# Patient Record
Sex: MALE | Race: WHITE | Employment: OTHER
[De-identification: names, ages, dates, MRNs, and addresses within clinical notes are randomized per-mention and may not be internally consistent; named-entity substitution may affect disease eponyms.]

---

## 2017-02-28 RX ORDER — CLONIDINE HYDROCHLORIDE 0.3 MG/1
0.3 TABLET ORAL 2 TIMES DAILY
Qty: 60 TABLET | Refills: 3 | Status: SHIPPED | OUTPATIENT
Start: 2017-02-28 | End: 2017-03-03 | Stop reason: SDUPTHER

## 2017-03-03 ENCOUNTER — OFFICE VISIT (OUTPATIENT)
Dept: FAMILY MEDICINE CLINIC | Facility: CLINIC | Age: 77
End: 2017-03-03

## 2017-03-03 ENCOUNTER — HOSPITAL ENCOUNTER (OUTPATIENT)
Age: 77
Setting detail: SPECIMEN
Discharge: HOME | End: 2017-03-03
Payer: MEDICARE

## 2017-03-03 VITALS
HEART RATE: 69 BPM | DIASTOLIC BLOOD PRESSURE: 80 MMHG | BODY MASS INDEX: 33.32 KG/M2 | SYSTOLIC BLOOD PRESSURE: 160 MMHG | OXYGEN SATURATION: 97 % | HEIGHT: 65 IN | WEIGHT: 200 LBS

## 2017-03-03 DIAGNOSIS — L03.011 PARONYCHIA OF FINGER, RIGHT: ICD-10-CM

## 2017-03-03 DIAGNOSIS — I10 ESSENTIAL HYPERTENSION: ICD-10-CM

## 2017-03-03 DIAGNOSIS — E78.2 MIXED HYPERLIPIDEMIA: ICD-10-CM

## 2017-03-03 DIAGNOSIS — Z23 IMMUNIZATION DUE: ICD-10-CM

## 2017-03-03 DIAGNOSIS — Z12.11 SCREEN FOR COLON CANCER: ICD-10-CM

## 2017-03-03 DIAGNOSIS — Z00.00 ROUTINE GENERAL MEDICAL EXAMINATION AT A HEALTH CARE FACILITY: Primary | ICD-10-CM

## 2017-03-03 LAB
ALBUMIN SERPL-MCNC: 4.1 G/DL (ref 3.5–5.2)
ALBUMIN/GLOBULIN RATIO: 1.2 (ref 1–2.5)
ALP BLD-CCNC: 61 U/L (ref 40–129)
ALT SERPL-CCNC: 9 U/L (ref 5–41)
ANION GAP SERPL CALCULATED.3IONS-SCNC: 15 MMOL/L (ref 9–17)
AST SERPL-CCNC: 12 U/L
BILIRUB SERPL-MCNC: 0.7 MG/DL (ref 0.3–1.2)
BUN BLDV-MCNC: 20 MG/DL (ref 8–23)
BUN/CREAT BLD: NORMAL (ref 9–20)
CALCIUM SERPL-MCNC: 9.1 MG/DL (ref 8.6–10.4)
CHLORIDE BLD-SCNC: 105 MMOL/L (ref 98–107)
CHOLESTEROL/HDL RATIO: 2.8
CHOLESTEROL: 186 MG/DL
CO2: 22 MMOL/L (ref 20–31)
CREAT SERPL-MCNC: 1.04 MG/DL (ref 0.7–1.2)
GFR AFRICAN AMERICAN: >60 ML/MIN
GFR NON-AFRICAN AMERICAN: >60 ML/MIN
GFR SERPL CREATININE-BSD FRML MDRD: NORMAL ML/MIN/{1.73_M2}
GFR SERPL CREATININE-BSD FRML MDRD: NORMAL ML/MIN/{1.73_M2}
GLUCOSE BLD-MCNC: 92 MG/DL (ref 70–99)
HDLC SERPL-MCNC: 67 MG/DL
LDL CHOLESTEROL: 102 MG/DL (ref 0–130)
POTASSIUM SERPL-SCNC: 4.4 MMOL/L (ref 3.7–5.3)
SODIUM BLD-SCNC: 142 MMOL/L (ref 135–144)
TOTAL PROTEIN: 7.4 G/DL (ref 6.4–8.3)
TRIGL SERPL-MCNC: 86 MG/DL
VLDLC SERPL CALC-MCNC: NORMAL MG/DL (ref 1–30)

## 2017-03-03 PROCEDURE — G0009 ADMIN PNEUMOCOCCAL VACCINE: HCPCS | Performed by: FAMILY MEDICINE

## 2017-03-03 PROCEDURE — G0438 PPPS, INITIAL VISIT: HCPCS | Performed by: FAMILY MEDICINE

## 2017-03-03 PROCEDURE — 90732 PPSV23 VACC 2 YRS+ SUBQ/IM: CPT | Performed by: FAMILY MEDICINE

## 2017-03-03 RX ORDER — CLONIDINE HYDROCHLORIDE 0.3 MG/1
0.3 TABLET ORAL 2 TIMES DAILY
Qty: 180 TABLET | Refills: 3 | Status: SHIPPED | OUTPATIENT
Start: 2017-03-03 | End: 2017-04-18 | Stop reason: SDUPTHER

## 2017-03-03 ASSESSMENT — LIFESTYLE VARIABLES
HOW OFTEN DURING THE LAST YEAR HAVE YOU BEEN UNABLE TO REMEMBER WHAT HAPPENED THE NIGHT BEFORE BECAUSE YOU HAD BEEN DRINKING: 0
HOW OFTEN DURING THE LAST YEAR HAVE YOU FOUND THAT YOU WERE NOT ABLE TO STOP DRINKING ONCE YOU HAD STARTED: 0
HAVE YOU OR SOMEONE ELSE BEEN INJURED AS A RESULT OF YOUR DRINKING: 0
HOW OFTEN DURING THE LAST YEAR HAVE YOU NEEDED AN ALCOHOLIC DRINK FIRST THING IN THE MORNING TO GET YOURSELF GOING AFTER A NIGHT OF HEAVY DRINKING: 0
HOW OFTEN DURING THE LAST YEAR HAVE YOU HAD A FEELING OF GUILT OR REMORSE AFTER DRINKING: 0
AUDIT TOTAL SCORE: 1
AUDIT-C TOTAL SCORE: 1
HOW OFTEN DO YOU HAVE SIX OR MORE DRINKS ON ONE OCCASION: 0
HAS A RELATIVE, FRIEND, DOCTOR, OR ANOTHER HEALTH PROFESSIONAL EXPRESSED CONCERN ABOUT YOUR DRINKING OR SUGGESTED YOU CUT DOWN: 0
HOW MANY STANDARD DRINKS CONTAINING ALCOHOL DO YOU HAVE ON A TYPICAL DAY: 0
HOW OFTEN DO YOU HAVE A DRINK CONTAINING ALCOHOL: 1
HOW OFTEN DURING THE LAST YEAR HAVE YOU FAILED TO DO WHAT WAS NORMALLY EXPECTED FROM YOU BECAUSE OF DRINKING: 0

## 2017-03-03 ASSESSMENT — PATIENT HEALTH QUESTIONNAIRE - PHQ9: SUM OF ALL RESPONSES TO PHQ QUESTIONS 1-9: 0

## 2017-03-03 ASSESSMENT — ANXIETY QUESTIONNAIRES: GAD7 TOTAL SCORE: 0

## 2017-03-10 ENCOUNTER — TELEPHONE (OUTPATIENT)
Dept: FAMILY MEDICINE CLINIC | Facility: CLINIC | Age: 77
End: 2017-03-10

## 2017-03-10 DIAGNOSIS — K92.1 MELENA: Primary | ICD-10-CM

## 2017-03-10 DIAGNOSIS — Z12.11 SCREEN FOR COLON CANCER: ICD-10-CM

## 2017-03-10 LAB
CONTROL: PRESENT
HEMOCCULT STL QL: POSITIVE

## 2017-03-10 PROCEDURE — 82274 ASSAY TEST FOR BLOOD FECAL: CPT | Performed by: FAMILY MEDICINE

## 2017-03-23 ENCOUNTER — OFFICE VISIT (OUTPATIENT)
Dept: ORTHOPEDIC SURGERY | Age: 77
End: 2017-03-23
Payer: MEDICARE

## 2017-03-23 VITALS — WEIGHT: 200 LBS | HEIGHT: 66 IN | BODY MASS INDEX: 32.14 KG/M2

## 2017-03-23 DIAGNOSIS — M79.641 PAIN OF RIGHT HAND: Primary | ICD-10-CM

## 2017-03-23 DIAGNOSIS — M67.40 GANGLION CYST: ICD-10-CM

## 2017-03-23 PROCEDURE — 99203 OFFICE O/P NEW LOW 30 MIN: CPT | Performed by: ORTHOPAEDIC SURGERY

## 2017-04-06 DIAGNOSIS — K92.1 MELENA: ICD-10-CM

## 2017-04-06 DIAGNOSIS — K92.1 MELENA: Primary | ICD-10-CM

## 2017-04-06 LAB
CONTROL: PRESENT
HEMOCCULT STL QL: NEGATIVE

## 2017-04-06 PROCEDURE — 82274 ASSAY TEST FOR BLOOD FECAL: CPT | Performed by: FAMILY MEDICINE

## 2017-04-18 DIAGNOSIS — I10 ESSENTIAL HYPERTENSION: ICD-10-CM

## 2017-04-18 RX ORDER — CLONIDINE HYDROCHLORIDE 0.3 MG/1
0.3 TABLET ORAL 2 TIMES DAILY
Qty: 180 TABLET | Refills: 3 | Status: SHIPPED | OUTPATIENT
Start: 2017-04-18 | End: 2017-12-18 | Stop reason: SDUPTHER

## 2017-04-20 ENCOUNTER — OFFICE VISIT (OUTPATIENT)
Dept: ORTHOPEDIC SURGERY | Age: 77
End: 2017-04-20
Payer: MEDICARE

## 2017-04-20 DIAGNOSIS — M67.40 GANGLION CYST: ICD-10-CM

## 2017-04-20 DIAGNOSIS — M79.641 PAIN OF RIGHT HAND: Primary | ICD-10-CM

## 2017-04-20 PROCEDURE — 99213 OFFICE O/P EST LOW 20 MIN: CPT | Performed by: ORTHOPAEDIC SURGERY

## 2017-07-12 ENCOUNTER — TELEPHONE (OUTPATIENT)
Dept: FAMILY MEDICINE CLINIC | Age: 77
End: 2017-07-12

## 2017-07-12 RX ORDER — DOXYCYCLINE 100 MG/1
100 CAPSULE ORAL 2 TIMES DAILY WITH MEALS
Qty: 20 CAPSULE | Refills: 0 | Status: SHIPPED | OUTPATIENT
Start: 2017-07-12 | End: 2017-10-15 | Stop reason: ALTCHOICE

## 2017-10-15 ENCOUNTER — HOSPITAL ENCOUNTER (EMERGENCY)
Age: 77
Discharge: HOME | End: 2017-10-15
Attending: SPECIALIST
Payer: MEDICARE

## 2017-10-15 ENCOUNTER — APPOINTMENT (OUTPATIENT)
Dept: GENERAL RADIOLOGY | Age: 77
End: 2017-10-15
Payer: MEDICARE

## 2017-10-15 VITALS
HEIGHT: 66 IN | HEART RATE: 71 BPM | SYSTOLIC BLOOD PRESSURE: 167 MMHG | DIASTOLIC BLOOD PRESSURE: 79 MMHG | TEMPERATURE: 97.9 F | RESPIRATION RATE: 16 BRPM | OXYGEN SATURATION: 96 % | WEIGHT: 215 LBS | BODY MASS INDEX: 34.55 KG/M2

## 2017-10-15 DIAGNOSIS — S62.640A CLOSED NONDISPLACED FRACTURE OF PROXIMAL PHALANX OF RIGHT INDEX FINGER, INITIAL ENCOUNTER: Primary | ICD-10-CM

## 2017-10-15 DIAGNOSIS — S61.412A LACERATION OF LEFT HAND WITHOUT FOREIGN BODY, INITIAL ENCOUNTER: ICD-10-CM

## 2017-10-15 PROCEDURE — 29125 APPL SHORT ARM SPLINT STATIC: CPT

## 2017-10-15 PROCEDURE — 90715 TDAP VACCINE 7 YRS/> IM: CPT | Performed by: SPECIALIST

## 2017-10-15 PROCEDURE — 90471 IMMUNIZATION ADMIN: CPT | Performed by: SPECIALIST

## 2017-10-15 PROCEDURE — 73130 X-RAY EXAM OF HAND: CPT

## 2017-10-15 PROCEDURE — 6360000002 HC RX W HCPCS: Performed by: SPECIALIST

## 2017-10-15 PROCEDURE — 6370000000 HC RX 637 (ALT 250 FOR IP): Performed by: SPECIALIST

## 2017-10-15 PROCEDURE — 99283 EMERGENCY DEPT VISIT LOW MDM: CPT

## 2017-10-15 RX ORDER — CEPHALEXIN 500 MG/1
500 CAPSULE ORAL 4 TIMES DAILY
Qty: 28 CAPSULE | Refills: 0 | Status: SHIPPED | OUTPATIENT
Start: 2017-10-15 | End: 2017-10-22

## 2017-10-15 RX ORDER — GINSENG 100 MG
CAPSULE ORAL ONCE
Status: COMPLETED | OUTPATIENT
Start: 2017-10-15 | End: 2017-10-15

## 2017-10-15 RX ADMIN — BACITRACIN: 500 OINTMENT TOPICAL at 15:42

## 2017-10-15 RX ADMIN — TETANUS TOXOID, REDUCED DIPHTHERIA TOXOID AND ACELLULAR PERTUSSIS VACCINE, ADSORBED 0.5 ML: 5; 2.5; 8; 8; 2.5 SUSPENSION INTRAMUSCULAR at 15:33

## 2017-10-15 NOTE — ED PROVIDER NOTES
[facility]  eMERGENCY dEPARTMENT eNCOUnter      Pt Name: Tiffanie Alfred  MRN: [id number]  [place] 1940  Date of evaluation: 10/15/17      CHIEF COMPLAINT       Chief Complaint   Patient presents with    Hand Laceration     left cut yesterday at approx 1200    Hand Injury     2 wks ago while power washing         HISTORY OF PRESENT ILLNESS    Tiffanie Alfred is a 68 y.o. male who presents To the emergency department after he sustained injury to the right hand 2 weeks ago while power washing. Patient complains of pain and swelling at the base of the second and third finger near the metacarpophalangeal joint area. He denies any tingling, numbness or weakness distally. Patient also complains of laceration at the base of the left palm between the thumb and index finger sustained yesterday at approximately 12 noon with a steak knife which was jamal. He denies any tingling, numbness or weakness distally. Last tetanus injection is unknown. Patient denies any significant pain in the left hand at the site of the laceration and pain is very mild on the dorsal aspect of the right hand. He denies any other injuries. Patient has not taken any pain medications so far. REVIEW OF SYSTEMS       Review of Systems   Musculoskeletal: Positive for arthralgias, joint swelling and myalgias. Skin: Positive for wound. All other systems reviewed and are negative. PAST MEDICAL HISTORY    has a past medical history of Choroid melanoma of left eye ([address] Hyperlipidemia; Hypertension; and [place] eye. SURGICAL HISTORY      has a past surgical history that includes Spine surgery. CURRENT MEDICATIONS       Discharge Medication List as of 10/15/2017  4:41 PM      CONTINUE these medications which have NOT CHANGED    Details   cloNIDine (CATAPRES) 0.3 MG tablet Take 1 tablet by mouth 2 times daily, Disp-180 tablet, R-3Normal      tamsulosin (FLOMAX) 0.4 MG capsule Take 0.4 mg by mouth daily. finasteride (PROSCAR) 5 MG tablet Take 5 mg by mouth daily. Lutein-Bilberry (LUTEIN PLUS BILBERRY PO) Take  by mouth. CRANBERRY EXTRACT PO Take  by mouth.      enalapril-hydrochlorothiazide (VASERETIC) 10-25 MG per tablet Take 1 tablet by mouth daily. , Disp-90 tablet, R-3             ALLERGIES     is allergic to dye [iodides]; pcn [penicillins]; penicillin v potassium; iodine; and oxycodone-acetaminophen. FAMILY HISTORY     has no family status information on file. family history is not on file. SOCIAL HISTORY      reports that he has never smoked. He has never used smokeless tobacco. He reports that he drinks alcohol. He reports that he does not use drugs. PHYSICAL EXAM     INITIAL VITALS:  height is 5' 6\" (1.676 m) and weight is 97.5 kg (215 lb). His oral temperature is 97.9 °F (36.6 °C). His blood pressure is 167/79 (abnormal) and his pulse is 71. His respiration is 16 and oxygen saturation is 96%. Physical Exam   Constitutional: He is oriented to person, place, and time. He appears well-developed and well-nourished. No distress. HENT:   Head: Normocephalic and atraumatic. Nose: Nose normal.   Mouth/Throat: Oropharynx is clear and moist. No oropharyngeal exudate. Eyes: EOM are normal. Pupils are equal, round, and reactive to light. No scleral icterus. Neck: Neck supple. No JVD present. No tracheal deviation present. No thyromegaly present. Cardiovascular: Normal rate, regular rhythm, normal heart sounds and intact distal pulses. Exam reveals no gallop and no friction rub. No murmur heard. Pulmonary/Chest: Effort normal and breath sounds normal. No respiratory distress. He has no wheezes. He has no rales. Abdominal: Soft. Bowel sounds are normal. He exhibits no distension and no mass. There is no tenderness. There is no rebound and no guarding. Musculoskeletal: He exhibits no edema. Right hand: He exhibits tenderness, bony tenderness and swelling.  He exhibits no deformity. Left hand: He exhibits laceration. Patient has mild edema and tenderness at the base of the right index finger near the metacarpophalangeal joint of the right index finger. Skin is intact. There is no obvious deformity circulatory, motor and sensory examinations intact distally. Lymphadenopathy:     He has no cervical adenopathy. Neurological: He is alert and oriented to person, place, and time. He displays normal reflexes. No cranial nerve deficit. Skin: Skin is warm and dry. Laceration noted. No rash noted. No erythema. Patient has a flap laceration between the left thumb and index finger. There is no evidence of foreign body, tendon or nerve involvement. Neurovascular examination is intact distally. Nursing note and vitals reviewed. DIFFERENTIAL DIAGNOSIS/ MDM:     Right hand fracture, contusion, left hand laceration    DIAGNOSTIC RESULTS     EKG: All EKG's are interpreted by the Emergency Department Physician who either signs or Co-signs this chart in the absence of a cardiologist.    None obtained    RADIOLOGY:   Non-plain film images such as CT, Ultrasound and MRI are read by the radiologist. [facility] radiographic images are visualized and the radiologist interpretations are reviewed as follows:       Xr Hand Right (min 3 Views)    Result Date: 10/15/2017  EXAMINATION: 3 VIEWS OF THE RIGHT HAND 10/15/2017 3:42 pm COMPARISON: None. HISTORY: ORDERING SYSTEM PROVIDED HISTORY: Injury TECHNOLOGIST PROVIDED HISTORY: Reason for exam:->Injury Ordering Physician Provided Reason for Exam: right hnad MCP pain Acuity: Acute Type of Exam: Initial Mechanism of Injury: hit while pulling engine 59-year-old male with acute pain of the right 2nd MCP joint /right hand following an injury FINDINGS: Moderate to severe degenerative changes at the 3rd MCP joint and 1st CMC joint.   Mild degenerative changes of the 1st MCP joint and 2nd MCP joint as well as the scaphoid -trapezium articulation. Positive ulnar variance. Diffuse osteopenia. No marginal erosions. Age-indeterminate intra-articular fracture lucency through the ulnar base of the proximal phalanx of the 2nd digit. Soft tissue swelling at the dorsal aspect of the MCP joints on the lateral view. 1. Age-indeterminate intra-articular fracture lucency through the ulnar base of the proximal phalanx of the 2nd digit. 2. Degenerative changes as detailed above. Diffuse osteopenia. Mild to moderate osteoarthrosis. LABS:  No results found for this visit on 10/15/17. EMERGENCY DEPARTMENT COURSE:   Vitals:    Vitals:    10/15/17 1450   BP: (!) 167/79   Pulse: 71   Resp: 16   Temp: 97.9 °F (36.6 °C)   TempSrc: Oral   SpO2: 96%   Weight: 97.5 kg (215 lb)   Height: 5' 6\" (1.676 m)     -------------------------  BP: (!) 167/79, Temp: 97.9 °F (36.6 °C), Pulse: 71, Resp: 16    Orders Placed This Encounter   Medications    Tetanus-Diphth-Acell Pertussis (BOOSTRIX) injection 0.5 mL    bacitracin ointment    cephALEXin (KEFLEX) 500 MG capsule     Sig: Take 1 capsule by mouth 4 times daily for 7 days     Dispense:  28 capsule     Refill:  0         During emergency department course, patient was given Boostrix 0.5 ML intramuscularly and bacitracin ointment and dressing was applied to the laceration. Volar splint was applied to the right hand and wrist by myself. Neurovascular examination is intact distally after splint application. Patient tolerated the procedure well. He was discharged home on Keflex 500 mg 4 times daily for 7 days. He was given referral to orthopedic surgeon Dr. Wesley Franco. Patient was advised to call his office for appointment, return if worse. Wound care instructions were given. CONSULTS:  Dr. Wesley Franco as an outpatient.     PROCEDURES:  Splint Application  Date/Time: 10/15/2017 5:42 PM  Performed by: Mario Lacey  Authorized by: Mario Lacey     Consent:     Consent obtained:  Verbal    Consent given by:  Patient    Risks discussed:  Discoloration, numbness and swelling    Alternatives discussed:  No treatment, delayed treatment and referral  Pre-procedure details:     Sensation:  Normal  Procedure details:     Laterality:  Right    Location:  Hand    Hand:  R hand    Splint type:  Volar short arm    Supplies:  Cotton padding, elastic bandage and Ortho-Glass  Post-procedure details:     Pain:  Unchanged    Sensation:  Normal    Patient tolerance of procedure: Tolerated well, no immediate complications        FINAL IMPRESSION      1. Closed nondisplaced fracture of proximal phalanx of right index finger, initial encounter    2. Laceration of left hand without foreign body, initial encounter          DISPOSITION/PLAN       PATIENT REFERRED TO:  Tommy Monge, [address] [facility] [address]  Jun antony [address] [place] [address]  [phone]    Call today  For reevaluation of current symptoms    [facility] ED  [address] [place] [address]  [phone]    If symptoms worsen      DISCHARGE MEDICATIONS:  Discharge Medication List as of 10/15/2017  4:41 PM      START taking these medications    Details   cephALEXin (KEFLEX) 500 MG capsule Take 1 capsule by mouth 4 times daily for 7 days, Disp-28 capsule, R-0Print             (Please note that portions of this note were completed with a voice recognition program.  Efforts were made to edit the dictations but occasionally words are mis-transcribed.)    Heller MD, F.A.C.E.P.   Attending Emergency Medicine Physician         Leticia Manzanares MD  10/15/17 3148

## 2017-10-15 NOTE — ED NOTES
Patient cleared for discharge per MD. Patient discharge instructions explained, Rx given and explained to patient. Patient Verbalized understanding of all instructions and all patient questions answered to their satisfaction. Patient departs from ED in stable condition.         Ayush Weber RN  10/15/17 3219

## 2017-10-17 ENCOUNTER — OFFICE VISIT (OUTPATIENT)
Dept: ORTHOPEDIC SURGERY | Age: 77
End: 2017-10-17
Payer: MEDICARE

## 2017-10-17 VITALS
WEIGHT: 210 LBS | DIASTOLIC BLOOD PRESSURE: 90 MMHG | SYSTOLIC BLOOD PRESSURE: 161 MMHG | BODY MASS INDEX: 32.96 KG/M2 | OXYGEN SATURATION: 97 % | HEART RATE: 65 BPM | HEIGHT: 67 IN

## 2017-10-17 DIAGNOSIS — S62.640A NONDISPLACED FRACTURE OF PROXIMAL PHALANX OF RIGHT INDEX FINGER, INITIAL ENCOUNTER FOR CLOSED FRACTURE: Primary | ICD-10-CM

## 2017-10-17 PROCEDURE — 99203 OFFICE O/P NEW LOW 30 MIN: CPT | Performed by: FAMILY MEDICINE

## 2017-10-17 ASSESSMENT — PATIENT HEALTH QUESTIONNAIRE - PHQ9
2. FEELING DOWN, DEPRESSED OR HOPELESS: 0
1. LITTLE INTEREST OR PLEASURE IN DOING THINGS: 0
SUM OF ALL RESPONSES TO PHQ QUESTIONS 1-9: 0
SUM OF ALL RESPONSES TO PHQ9 QUESTIONS 1 & 2: 0

## 2017-10-17 NOTE — PROGRESS NOTES
Sports Medicine Consultation    CHIEF COMPLAINT:  Hand Pain (Right hand pain. Patient was starting  and cord snapped back hit his finger.)      HPI:  The patient is a 68 y.o. male who is being seen for  new patient being seen for regarding new problem of  Right hand pain. The patient is a right hand dominant male who has had right hand/wrist pain for 3 weeks. As far as trauma to the hand the patient indicates turning on a  and it backfired and the cord snapped back and hit hand. The following medications/interventions have been tried: bracing without benefit. he has a past medical history of Choroid melanoma of left eye ([address] Hyperlipidemia; Hypertension; and Pink eye.    he has a past surgical history that includes Spine surgery. family history is not on file. Social History     Social History    Marital status:      Spouse name: N/A    Number of children: N/A    Years of education: N/A     Occupational History    Not on file. Social History Main Topics    Smoking status: Never Smoker    Smokeless tobacco: Never Used    Alcohol use Yes      Comment: occasionally    Drug use: No    Sexual activity: Not on file     Other Topics Concern    Not on file     Social History Narrative    No narrative on file       Current Outpatient Prescriptions   Medication Sig Dispense Refill    cephALEXin (KEFLEX) 500 MG capsule Take 1 capsule by mouth 4 times daily for 7 days 28 capsule 0    cloNIDine (CATAPRES) 0.3 MG tablet Take 1 tablet by mouth 2 times daily 180 tablet 3    enalapril-hydrochlorothiazide (VASERETIC) 10-25 MG per tablet Take 1 tablet by mouth daily. 90 tablet 3    tamsulosin (FLOMAX) 0.4 MG capsule Take 0.4 mg by mouth daily.  finasteride (PROSCAR) 5 MG tablet Take 5 mg by mouth daily.  Lutein-Bilberry (LUTEIN PLUS BILBERRY PO) Take  by mouth.  CRANBERRY EXTRACT PO Take  by mouth.        No current facility-administered views of the right hand were ordered,  independently visualized by me, and discussed with patient. Findings: Index finger did demonstrate a nondisplaced fracture of the proximal ulnar side of the proximal phalanx of the right index finger    IMPRESSION:     1. Nondisplaced fracture of proximal phalanx of right index finger, initial encounter for closed fracture        PLAN:   We discussed some of the etiologies and natural histories of     ICD-10-CM ICD-9-CM    1. Nondisplaced fracture of proximal phalanx of right index finger, initial encounter for closed fracture S62.640A 816.01      We discussed the various treatment alternatives including anti-inflammatory medications, physical therapy, injections, further imaging studies and as a last resort surgery. At this point he is 3 weeks out from his initial injury and I do think we can get away with carolina taping the index and long finger together. We will see him back in 3w    No Follow-up on file.     Electronically signed by Agustín James DO, FAOASM  on 10/17/17 at 11:20 AM

## 2017-11-06 ENCOUNTER — OFFICE VISIT (OUTPATIENT)
Dept: ORTHOPEDIC SURGERY | Age: 77
End: 2017-11-06
Payer: MEDICARE

## 2017-11-06 VITALS — WEIGHT: 200 LBS | BODY MASS INDEX: 32.14 KG/M2 | HEIGHT: 66 IN

## 2017-11-06 DIAGNOSIS — S62.640G: Primary | ICD-10-CM

## 2017-11-06 PROCEDURE — 99213 OFFICE O/P EST LOW 20 MIN: CPT | Performed by: FAMILY MEDICINE

## 2017-11-08 ENCOUNTER — NURSE ONLY (OUTPATIENT)
Dept: FAMILY MEDICINE CLINIC | Age: 77
End: 2017-11-08
Payer: MEDICARE

## 2017-11-08 DIAGNOSIS — Z23 IMMUNIZATION DUE: Primary | ICD-10-CM

## 2017-11-08 PROCEDURE — G0009 ADMIN PNEUMOCOCCAL VACCINE: HCPCS | Performed by: FAMILY MEDICINE

## 2017-11-08 PROCEDURE — 90670 PCV13 VACCINE IM: CPT | Performed by: FAMILY MEDICINE

## 2017-12-18 DIAGNOSIS — I10 ESSENTIAL HYPERTENSION: ICD-10-CM

## 2017-12-18 RX ORDER — CLONIDINE HYDROCHLORIDE 0.3 MG/1
0.3 TABLET ORAL 2 TIMES DAILY
Qty: 180 TABLET | Refills: 3 | Status: SHIPPED | OUTPATIENT
Start: 2017-12-18

## 2018-04-26 ENCOUNTER — OFFICE VISIT (OUTPATIENT)
Dept: FAMILY MEDICINE CLINIC | Age: 78
End: 2018-04-26
Payer: MEDICARE

## 2018-04-26 VITALS
SYSTOLIC BLOOD PRESSURE: 154 MMHG | DIASTOLIC BLOOD PRESSURE: 80 MMHG | BODY MASS INDEX: 35.35 KG/M2 | WEIGHT: 219 LBS | HEART RATE: 95 BPM

## 2018-04-26 DIAGNOSIS — L02.213 CHEST WALL ABSCESS: Primary | ICD-10-CM

## 2018-04-26 PROCEDURE — 99213 OFFICE O/P EST LOW 20 MIN: CPT | Performed by: FAMILY MEDICINE

## 2018-04-26 ASSESSMENT — ENCOUNTER SYMPTOMS
WHEEZING: 0
CONSTIPATION: 0
SHORTNESS OF BREATH: 0
DIARRHEA: 0
BACK PAIN: 0
ABDOMINAL PAIN: 0
BLOOD IN STOOL: 0
COUGH: 0

## 2018-09-03 ENCOUNTER — APPOINTMENT (OUTPATIENT)
Dept: GENERAL RADIOLOGY | Age: 78
End: 2018-09-03
Payer: MEDICARE

## 2018-09-03 ENCOUNTER — HOSPITAL ENCOUNTER (EMERGENCY)
Age: 78
Discharge: HOME | End: 2018-09-03
Attending: EMERGENCY MEDICINE
Payer: MEDICARE

## 2018-09-03 VITALS
BODY MASS INDEX: 32.78 KG/M2 | HEIGHT: 66 IN | DIASTOLIC BLOOD PRESSURE: 98 MMHG | WEIGHT: 204 LBS | HEART RATE: 66 BPM | TEMPERATURE: 98.1 F | SYSTOLIC BLOOD PRESSURE: 190 MMHG | OXYGEN SATURATION: 97 % | RESPIRATION RATE: 14 BRPM

## 2018-09-03 DIAGNOSIS — S46.912A STRAIN OF LEFT SHOULDER, INITIAL ENCOUNTER: ICD-10-CM

## 2018-09-03 DIAGNOSIS — S46.911A STRAIN OF RIGHT SHOULDER, INITIAL ENCOUNTER: Primary | ICD-10-CM

## 2018-09-03 PROCEDURE — 99283 EMERGENCY DEPT VISIT LOW MDM: CPT

## 2018-09-03 PROCEDURE — 73030 X-RAY EXAM OF SHOULDER: CPT

## 2018-09-03 RX ORDER — IBUPROFEN 800 MG/1
800 TABLET ORAL ONCE
Status: DISCONTINUED | OUTPATIENT
Start: 2018-09-03 | End: 2018-09-03

## 2018-09-03 RX ORDER — HYDROCODONE BITARTRATE AND ACETAMINOPHEN 5; 325 MG/1; MG/1
1 TABLET ORAL EVERY 6 HOURS PRN
Qty: 10 TABLET | Refills: 0 | Status: SHIPPED | OUTPATIENT
Start: 2018-09-03 | End: 2018-09-05 | Stop reason: ALTCHOICE

## 2018-09-03 ASSESSMENT — PAIN SCALES - GENERAL: PAINLEVEL_OUTOF10: 8

## 2018-09-03 ASSESSMENT — PAIN DESCRIPTION - ORIENTATION: ORIENTATION: RIGHT;LEFT

## 2018-09-03 ASSESSMENT — PAIN DESCRIPTION - PAIN TYPE: TYPE: ACUTE PAIN

## 2018-09-03 ASSESSMENT — PAIN DESCRIPTION - DESCRIPTORS: DESCRIPTORS: SHARP

## 2018-09-03 ASSESSMENT — PAIN DESCRIPTION - LOCATION: LOCATION: SHOULDER

## 2018-09-03 NOTE — ED PROVIDER NOTES
[facility] ED  [address] [facility] [address]  Phone: [phone]  Fax: [phone]    Pt Name: Kam Lisa  MRN: [id number]  [place] 1940  Date of evaluation: 9/3/2018      CHIEF COMPLAINT       Chief Complaint   Patient presents with    Shoulder Pain     bilateral         HISTORY OF PRESENT ILLNESS     Kam Lisa is a 66 y.o. male who presents With bilateral shoulder pain. Upper injuries to his shoulders over the past 3 days. His right shoulder was injured when he was attempting to push a heavy object with his arms and soon after that he started to have shoulder pain and has trouble with full abduction since that time. There was no blunt trauma to his right shoulder. There is no distal neurovascular compromise of the right arm. The left shoulder 3 days ago showed a  as he was walking through a bar and has pain throughout the anterior portion of the shoulder. Fairly good range of motion. No left arm symptoms otherwise. Denies any other injuries. She is anything for pain at this time. His blood pressure is elevated upon admission but the rest of his vital signs are normal.  His pain in both shoulders is an 8 out of a 10 at this time. REVIEW OF SYSTEMS       Other ROS negative except as noted above. PAST MEDICAL HISTORY    has a past medical history of Choroid melanoma of left eye ([address] Hyperlipidemia; Hypertension; and [place] eye. SURGICAL HISTORY      has a past surgical history that includes Spine surgery. CURRENT MEDICATIONS       Discharge Medication List as of 9/3/2018 10:04 AM      CONTINUE these medications which have NOT CHANGED    Details   cloNIDine (CATAPRES) 0.3 MG tablet Take 1 tablet by mouth 2 times daily, Disp-180 tablet, R-3Normal      tamsulosin (FLOMAX) 0.4 MG capsule Take 0.4 mg by mouth daily. finasteride (PROSCAR) 5 MG tablet Take 5 mg by mouth daily.       Lutein-Bilberry (LUTEIN PLUS BILBERRY PO) Take  by

## 2018-09-05 ENCOUNTER — OFFICE VISIT (OUTPATIENT)
Dept: ORTHOPEDIC SURGERY | Age: 78
End: 2018-09-05
Payer: MEDICARE

## 2018-09-05 VITALS — WEIGHT: 203.93 LBS | BODY MASS INDEX: 32.77 KG/M2 | HEIGHT: 66 IN

## 2018-09-05 DIAGNOSIS — M75.41 SHOULDER IMPINGEMENT, RIGHT: Primary | ICD-10-CM

## 2018-09-05 DIAGNOSIS — M75.42 SHOULDER IMPINGEMENT, LEFT: ICD-10-CM

## 2018-09-05 PROCEDURE — 20610 DRAIN/INJ JOINT/BURSA W/O US: CPT | Performed by: ORTHOPAEDIC SURGERY

## 2018-09-05 PROCEDURE — 99203 OFFICE O/P NEW LOW 30 MIN: CPT | Performed by: ORTHOPAEDIC SURGERY

## 2018-09-05 RX ORDER — MELOXICAM 15 MG/1
15 TABLET ORAL DAILY
Qty: 30 TABLET | Refills: 3 | Status: SHIPPED | OUTPATIENT
Start: 2018-09-05

## 2018-09-05 RX ORDER — BUPIVACAINE HYDROCHLORIDE 2.5 MG/ML
2 INJECTION, SOLUTION INFILTRATION; PERINEURAL ONCE
Status: COMPLETED | OUTPATIENT
Start: 2018-09-05 | End: 2018-09-06

## 2018-09-05 RX ORDER — METHYLPREDNISOLONE ACETATE 80 MG/ML
80 INJECTION, SUSPENSION INTRA-ARTICULAR; INTRALESIONAL; INTRAMUSCULAR; SOFT TISSUE ONCE
Status: COMPLETED | OUTPATIENT
Start: 2018-09-05 | End: 2018-09-06

## 2018-09-05 ASSESSMENT — ENCOUNTER SYMPTOMS
CONSTIPATION: 0
COUGH: 0
DIARRHEA: 0
NAUSEA: 0

## 2018-09-05 NOTE — LETTER
Dr. Chen Aguiar  [address] [place] [address]  Dept: [phone]  Dept Fax: [phone]        9/6/18    Patient: Kam Lisa  YOB: 1940    Dear Shaye Tee MD,    I had the pleasure of seeing one of your patients, Amada Yanez today in the office. Below are the relevant portions of my assessment and plan of care. IMPRESSION:     1. Shoulder impingement, right    2. Shoulder impingement, left      PLAN:       Discussed etiology and natural history of bilateral shoulder impingment. The treatment options may include oral anti-inflammatories, bracing, injections, advanced imaging, activity modification, physical therapy and/or surgical intervention. The patient would like to proceed with a left shoulder corticosteroid injection and mobic. The patient will follow up in 4 weeks. We discussed that the patient should call us with any concerns or questions. Thank you for allowing me to participate in the care of this patient. I will keep you updated on this patient's follow up and I look forward to serving you and your patients again in the future. Please don't hesitate to contact me at my mobile number [phone].         Kody Peres Primer

## 2018-09-05 NOTE — PROGRESS NOTES
The patient was identified. The left shoulder was confirmed with the patient. After a sterile prep with Betadine the shoulder  was injected using a posterior approach to the subacromial space with a mixture of 2 mL of 0.25% Marcaine and 80 mg of Depo-Medrol. Patient tolerated the procedure well without post injection complications. I instructed the patient to call our office immediately if they have any swelling or increased pain at the injection site. ASSESSMENT:     1. Shoulder impingement, right    2. Shoulder impingement, left         PLAN:       Discussed etiology and natural history of bilateral shoulder impingment. The treatment options may include oral anti-inflammatories, bracing, injections, advanced imaging, activity modification, physical therapy and/or surgical intervention. The patient would like to proceed with a left shoulder corticosteroid injection and mobic. The patient will follow up in 4 weeks. We discussed that the patient should call us with any concerns or questions. Return in about 4 weeks (around 10/3/2018) for re- evaluation. Sully Mitchell RN am scribing for and in the presence of Dr. Bella Galo  9/6/2018 4:56 PM    I have reviewed and made changes accordingly to the work scribed by Christine oDn RN. The documentation accurately reflects work and decisions made by me. I have also reviewed documentation completed by clinical staff.     Bella Galo DO, [address] [facility]  9/6/2018 4:57 PM     This note is created with the assistance of a speech recognition program.  While intending to generate a document that actually reflects the content of the visit, the document can still have some errors including those of syntax and sound a like substitutions which may escape proof reading.  In such instances, actual meaning can be extrapolated by contextual diversion        Orders Placed This Encounter   Medications   

## 2018-09-06 RX ADMIN — METHYLPREDNISOLONE ACETATE 80 MG: 80 INJECTION, SUSPENSION INTRA-ARTICULAR; INTRALESIONAL; INTRAMUSCULAR; SOFT TISSUE at 09:30

## 2018-09-06 RX ADMIN — BUPIVACAINE HYDROCHLORIDE 5 MG: 2.5 INJECTION, SOLUTION INFILTRATION; PERINEURAL at 09:30

## 2018-10-04 ENCOUNTER — OFFICE VISIT (OUTPATIENT)
Dept: ORTHOPEDIC SURGERY | Age: 78
End: 2018-10-04
Payer: MEDICARE

## 2018-10-04 VITALS — HEIGHT: 66 IN | BODY MASS INDEX: 32.62 KG/M2 | WEIGHT: 203 LBS

## 2018-10-04 DIAGNOSIS — M75.41 SHOULDER IMPINGEMENT, RIGHT: Primary | ICD-10-CM

## 2018-10-04 DIAGNOSIS — M75.101 TEAR OF RIGHT ROTATOR CUFF, UNSPECIFIED TEAR EXTENT: ICD-10-CM

## 2018-10-04 DIAGNOSIS — M19.012 BILATERAL SHOULDER REGION ARTHRITIS: ICD-10-CM

## 2018-10-04 DIAGNOSIS — M75.42 SHOULDER IMPINGEMENT, LEFT: ICD-10-CM

## 2018-10-04 DIAGNOSIS — M19.011 BILATERAL SHOULDER REGION ARTHRITIS: ICD-10-CM

## 2018-10-04 PROCEDURE — 99213 OFFICE O/P EST LOW 20 MIN: CPT | Performed by: ORTHOPAEDIC SURGERY

## 2018-10-04 ASSESSMENT — ENCOUNTER SYMPTOMS
ABDOMINAL PAIN: 0
APNEA: 0
CHEST TIGHTNESS: 0
VOMITING: 0
COUGH: 0

## 2018-10-04 NOTE — PROGRESS NOTES
Type of Exam: Initial       FINDINGS:   3 images of the right shoulder were provided.  Degenerative changes are   greatest in the [facility] joint.  Alignment is normal.  There is no fracture.           Impression   Degenerative changes without acute osseous abnormality. r      Assessment:      1. Shoulder impingement, right    2. Shoulder impingement, left    3. Tear of right rotator cuff, unspecified tear extent    4. Bilateral shoulder region arthritis       Plan:      Discussed etiology and natural history of bilateral shoulder impingment. The treatment options may include oral anti-inflammatories, bracing, injections, advanced imaging, activity modification, physical therapy and/or surgical intervention. The patient would like to proceed with formal physical therapy for bilateral shoulders. The patient will follow up in 7 weeks. If in 7 weeks the patient is not getting better would like patient to have an MRI of the right shoulder. We discussed that the patient should call us with any concerns or questions. Follow up:Return in about 7 weeks (around 11/22/2018). No orders of the defined types were placed in this encounter. Orders Placed This Encounter   Procedures    External Referral To Physical Therapy     Referral Priority:   Routine     Referral Type:   Eval and Treat     Referral Reason:   Specialty Services Required     Requested Specialty:   Physical Therapy     Number of Visits Requested:   1     I, Ernestina Pratt LPN am scribing for and in the presence of Dr. Kareen Jim  10/8/2018 1:23 PM     I have reviewed and made changes accordingly to the work scribed by Ernestina Pratt LPN. The documentation accurately reflects work and decisions made by me. I have also reviewed documentation completed by clinical staff.     Kareen Jim DO, [address] [facility] Sports Medicine  10/8/2018 1:30 PM     This note is created with the assistance of a speech recognition

## 2018-11-01 ENCOUNTER — HOSPITAL ENCOUNTER (OUTPATIENT)
Dept: GENERAL RADIOLOGY | Age: 78
Discharge: HOME | End: 2018-11-03
Payer: MEDICARE

## 2018-11-01 ENCOUNTER — HOSPITAL ENCOUNTER (OUTPATIENT)
Age: 78
Discharge: HOME | End: 2018-11-03
Payer: MEDICARE

## 2018-11-01 ENCOUNTER — TELEPHONE (OUTPATIENT)
Dept: ORTHOPEDIC SURGERY | Age: 78
End: 2018-11-01

## 2018-11-01 DIAGNOSIS — M79.641 PAIN OF RIGHT HAND: ICD-10-CM

## 2018-11-01 DIAGNOSIS — M79.641 PAIN OF RIGHT HAND: Primary | ICD-10-CM

## 2018-11-01 PROCEDURE — 73110 X-RAY EXAM OF WRIST: CPT

## 2018-11-01 PROCEDURE — 73130 X-RAY EXAM OF HAND: CPT

## 2018-12-20 ENCOUNTER — OFFICE VISIT (OUTPATIENT)
Dept: ORTHOPEDIC SURGERY | Age: 78
End: 2018-12-20
Payer: MEDICARE

## 2018-12-20 VITALS — BODY MASS INDEX: 32.62 KG/M2 | WEIGHT: 203 LBS | HEIGHT: 66 IN

## 2018-12-20 DIAGNOSIS — M19.011 BILATERAL SHOULDER REGION ARTHRITIS: ICD-10-CM

## 2018-12-20 DIAGNOSIS — M19.012 BILATERAL SHOULDER REGION ARTHRITIS: ICD-10-CM

## 2018-12-20 DIAGNOSIS — M75.41 SHOULDER IMPINGEMENT, RIGHT: Primary | ICD-10-CM

## 2018-12-20 DIAGNOSIS — M75.42 SHOULDER IMPINGEMENT, LEFT: ICD-10-CM

## 2018-12-20 PROCEDURE — 99213 OFFICE O/P EST LOW 20 MIN: CPT | Performed by: ORTHOPAEDIC SURGERY

## 2018-12-21 ASSESSMENT — ENCOUNTER SYMPTOMS
NAUSEA: 0
COUGH: 0
CONSTIPATION: 0
DIARRHEA: 0

## 2019-01-23 ENCOUNTER — OFFICE VISIT (OUTPATIENT)
Dept: ORTHOPEDIC SURGERY | Age: 79
End: 2019-01-23
Payer: MEDICARE

## 2019-01-23 VITALS — WEIGHT: 203 LBS | HEIGHT: 66 IN | BODY MASS INDEX: 32.62 KG/M2

## 2019-01-23 DIAGNOSIS — M75.41 SHOULDER IMPINGEMENT, RIGHT: Primary | ICD-10-CM

## 2019-01-23 DIAGNOSIS — M19.011 BILATERAL SHOULDER REGION ARTHRITIS: ICD-10-CM

## 2019-01-23 DIAGNOSIS — M19.012 BILATERAL SHOULDER REGION ARTHRITIS: ICD-10-CM

## 2019-01-23 DIAGNOSIS — M75.42 SHOULDER IMPINGEMENT, LEFT: ICD-10-CM

## 2019-01-23 PROCEDURE — 20610 DRAIN/INJ JOINT/BURSA W/O US: CPT | Performed by: ORTHOPAEDIC SURGERY

## 2019-01-23 PROCEDURE — 99213 OFFICE O/P EST LOW 20 MIN: CPT | Performed by: ORTHOPAEDIC SURGERY

## 2019-01-23 RX ORDER — MELOXICAM 15 MG/1
15 TABLET ORAL DAILY
Qty: 30 TABLET | Refills: 3 | Status: SHIPPED | OUTPATIENT
Start: 2019-01-23

## 2019-01-23 RX ORDER — BUPIVACAINE HYDROCHLORIDE 2.5 MG/ML
2 INJECTION, SOLUTION INFILTRATION; PERINEURAL ONCE
Status: COMPLETED | OUTPATIENT
Start: 2019-01-23 | End: 2019-01-24

## 2019-01-23 RX ORDER — METHYLPREDNISOLONE ACETATE 80 MG/ML
80 INJECTION, SUSPENSION INTRA-ARTICULAR; INTRALESIONAL; INTRAMUSCULAR; SOFT TISSUE ONCE
Status: COMPLETED | OUTPATIENT
Start: 2019-01-23 | End: 2019-01-24

## 2019-01-23 ASSESSMENT — ENCOUNTER SYMPTOMS
COUGH: 0
CONSTIPATION: 0
DIARRHEA: 0
NAUSEA: 0

## 2019-01-24 RX ADMIN — BUPIVACAINE HYDROCHLORIDE 5 MG: 2.5 INJECTION, SOLUTION INFILTRATION; PERINEURAL at 13:00

## 2019-01-24 RX ADMIN — METHYLPREDNISOLONE ACETATE 80 MG: 80 INJECTION, SUSPENSION INTRA-ARTICULAR; INTRALESIONAL; INTRAMUSCULAR; SOFT TISSUE at 13:01

## 2022-12-17 ENCOUNTER — HOSPITAL ENCOUNTER (EMERGENCY)
Age: 82
Discharge: HOME | End: 2022-12-18
Attending: EMERGENCY MEDICINE
Payer: MEDICARE

## 2022-12-17 DIAGNOSIS — I10 HYPERTENSION, UNSPECIFIED TYPE: Primary | ICD-10-CM

## 2022-12-17 PROCEDURE — 99282 EMERGENCY DEPT VISIT SF MDM: CPT

## 2022-12-17 RX ORDER — PANTOPRAZOLE SODIUM 20 MG/1
20 TABLET, DELAYED RELEASE ORAL DAILY
COMMUNITY

## 2022-12-18 ENCOUNTER — APPOINTMENT (OUTPATIENT)
Dept: GENERAL RADIOLOGY | Age: 82
End: 2022-12-18
Payer: MEDICARE

## 2022-12-18 VITALS
RESPIRATION RATE: 20 BRPM | TEMPERATURE: 98.2 F | SYSTOLIC BLOOD PRESSURE: 163 MMHG | WEIGHT: 210 LBS | DIASTOLIC BLOOD PRESSURE: 96 MMHG | HEIGHT: 66 IN | OXYGEN SATURATION: 97 % | HEART RATE: 62 BPM | BODY MASS INDEX: 33.75 KG/M2

## 2022-12-18 VITALS
HEART RATE: 81 BPM | SYSTOLIC BLOOD PRESSURE: 162 MMHG | BODY MASS INDEX: 33.75 KG/M2 | RESPIRATION RATE: 18 BRPM | HEIGHT: 66 IN | OXYGEN SATURATION: 98 % | TEMPERATURE: 98.1 F | DIASTOLIC BLOOD PRESSURE: 92 MMHG | WEIGHT: 210 LBS

## 2022-12-18 DIAGNOSIS — R07.9 CHEST PAIN, UNSPECIFIED TYPE: ICD-10-CM

## 2022-12-18 DIAGNOSIS — I10 HYPERTENSION, UNSPECIFIED TYPE: ICD-10-CM

## 2022-12-18 DIAGNOSIS — K20.90 ESOPHAGITIS: Primary | ICD-10-CM

## 2022-12-18 LAB
ABSOLUTE EOS #: 0.3 K/UL (ref 0–0.4)
ABSOLUTE LYMPH #: 1.1 K/UL (ref 1–4.8)
ABSOLUTE MONO #: 0.7 K/UL (ref 0.1–1.2)
ANION GAP SERPL CALCULATED.3IONS-SCNC: 11 MMOL/L (ref 9–17)
BASOPHILS # BLD: 1 % (ref 0–2)
BASOPHILS ABSOLUTE: 0.1 K/UL (ref 0–0.2)
BUN BLDV-MCNC: 19 MG/DL (ref 8–23)
CALCIUM SERPL-MCNC: 8.6 MG/DL (ref 8.6–10.4)
CHLORIDE BLD-SCNC: 106 MMOL/L (ref 98–107)
CO2: 22 MMOL/L (ref 20–31)
CREAT SERPL-MCNC: 1.1 MG/DL (ref 0.7–1.2)
D-DIMER QUANTITATIVE: 0.48 MG/L FEU
EOSINOPHILS RELATIVE PERCENT: 4 % (ref 1–4)
GFR SERPL CREATININE-BSD FRML MDRD: >60 ML/MIN/1.73M2
GLUCOSE BLD-MCNC: 93 MG/DL (ref 70–99)
HCT VFR BLD CALC: 38.4 % (ref 41–53)
HEMOGLOBIN: 12.7 G/DL (ref 13.5–17.5)
LYMPHOCYTES # BLD: 15 % (ref 24–44)
MCH RBC QN AUTO: 30.9 PG (ref 26–34)
MCHC RBC AUTO-ENTMCNC: 33.1 G/DL (ref 31–37)
MCV RBC AUTO: 93.3 FL (ref 80–100)
MONOCYTES # BLD: 9 % (ref 2–11)
PDW BLD-RTO: 14.9 % (ref 12.5–15.4)
PLATELET # BLD: 242 K/UL (ref 140–450)
PMV BLD AUTO: 8.7 FL (ref 6–12)
POTASSIUM SERPL-SCNC: 4.2 MMOL/L (ref 3.7–5.3)
PRO-BNP: 419 PG/ML
RBC # BLD: 4.11 M/UL (ref 4.5–5.9)
SEG NEUTROPHILS: 71 % (ref 36–66)
SEGMENTED NEUTROPHILS ABSOLUTE COUNT: 5.1 K/UL (ref 1.8–7.7)
SODIUM BLD-SCNC: 139 MMOL/L (ref 135–144)
TROPONIN, HIGH SENSITIVITY: 20 NG/L (ref 0–22)
TROPONIN, HIGH SENSITIVITY: 20 NG/L (ref 0–22)
WBC # BLD: 7.3 K/UL (ref 3.5–11)

## 2022-12-18 PROCEDURE — 85379 FIBRIN DEGRADATION QUANT: CPT

## 2022-12-18 PROCEDURE — 71045 X-RAY EXAM CHEST 1 VIEW: CPT

## 2022-12-18 PROCEDURE — 84484 ASSAY OF TROPONIN QUANT: CPT

## 2022-12-18 PROCEDURE — 83880 ASSAY OF NATRIURETIC PEPTIDE: CPT

## 2022-12-18 PROCEDURE — 99285 EMERGENCY DEPT VISIT HI MDM: CPT

## 2022-12-18 PROCEDURE — 93005 ELECTROCARDIOGRAM TRACING: CPT | Performed by: EMERGENCY MEDICINE

## 2022-12-18 PROCEDURE — 85025 COMPLETE CBC W/AUTO DIFF WBC: CPT

## 2022-12-18 PROCEDURE — 6370000000 HC RX 637 (ALT 250 FOR IP): Performed by: EMERGENCY MEDICINE

## 2022-12-18 PROCEDURE — 36415 COLL VENOUS BLD VENIPUNCTURE: CPT

## 2022-12-18 PROCEDURE — 80048 BASIC METABOLIC PNL TOTAL CA: CPT

## 2022-12-18 RX ORDER — MAGNESIUM HYDROXIDE/ALUMINUM HYDROXICE/SIMETHICONE 120; 1200; 1200 MG/30ML; MG/30ML; MG/30ML
30 SUSPENSION ORAL ONCE
Status: COMPLETED | OUTPATIENT
Start: 2022-12-18 | End: 2022-12-18

## 2022-12-18 RX ORDER — ASPIRIN 81 MG/1
324 TABLET, CHEWABLE ORAL ONCE
Status: COMPLETED | OUTPATIENT
Start: 2022-12-18 | End: 2022-12-18

## 2022-12-18 RX ADMIN — ASPIRIN 324 MG: 81 TABLET, CHEWABLE ORAL at 15:39

## 2022-12-18 RX ADMIN — ALUMINUM HYDROXIDE, MAGNESIUM HYDROXIDE, AND SIMETHICONE 30 ML: 200; 200; 20 SUSPENSION ORAL at 19:04

## 2022-12-18 ASSESSMENT — ENCOUNTER SYMPTOMS
GASTROINTESTINAL NEGATIVE: 1
ALLERGIC/IMMUNOLOGIC NEGATIVE: 1
EYES NEGATIVE: 1
RESPIRATORY NEGATIVE: 1

## 2022-12-18 ASSESSMENT — PAIN - FUNCTIONAL ASSESSMENT: PAIN_FUNCTIONAL_ASSESSMENT: 0-10

## 2022-12-18 ASSESSMENT — PAIN SCALES - GENERAL: PAINLEVEL_OUTOF10: 1

## 2022-12-18 NOTE — ED PROVIDER NOTES
eMERGENCY dEPARTMENT eNCOUnter      Pt Name: Aicha Elias  MRN: [id number]  [place] 1940  Date of evaluation: 12/17/2022      CHIEF COMPLAINT       Chief Complaint   Patient presents with    Hypertension          HISTORY OF PRESENT ILLNESS    Aicha Elias is a 80 y.o. male who presents to department for evaluation of an elevated blood pressure. Patient has no headache no chest pain or shortness of breath no nausea vomiting no neurologic signs. Initial pressure was 186/110. States it was higher than that at home. REVIEW OF SYSTEMS         Review of Systems   Constitutional: Negative. HENT: Negative. Eyes: Negative. Respiratory: Negative. Cardiovascular: Negative. Gastrointestinal: Negative. Endocrine: Negative. Genitourinary: Negative. Musculoskeletal: Negative. Skin: Negative. Allergic/Immunologic: Negative. Neurological: Negative. Hematological: Negative. Psychiatric/Behavioral: Negative. PAST MEDICAL HISTORY    has a past medical history of Choroid melanoma of left eye ([address] Hyperlipidemia, Hypertension, and [place] eye. SURGICAL HISTORY      has a past surgical history that includes Spine surgery; Eye surgery (Left); and colostomy. CURRENT MEDICATIONS       Previous Medications    CLONIDINE (CATAPRES) 0.3 MG TABLET    Take 1 tablet by mouth 2 times daily    CRANBERRY EXTRACT PO    Take  by mouth. FINASTERIDE (PROSCAR) 5 MG TABLET    Take 5 mg by mouth daily. PANTOPRAZOLE (PROTONIX) 20 MG TABLET    Take 20 mg by mouth daily    TAMSULOSIN (FLOMAX) 0.4 MG CAPSULE    Take 0.4 mg by mouth daily. ALLERGIES     is allergic to pcn [penicillins] and iodine. FAMILY HISTORY     has no family status information on file. family history is not on file. SOCIAL HISTORY      reports that he has never smoked. He has never used smokeless tobacco. He reports current alcohol use. He reports that he does not use drugs.     PHYSICAL EXAM INITIAL VITALS:  height is 5' 6\" (1.676 m) and weight is 95.3 kg (210 lb). His oral temperature is 98.1 °F (36.7 °C). His blood pressure is 162/92 (abnormal) and his pulse is 81. His respiration is 18 and oxygen saturation is 98%. Constitutional: Alert, oriented x3, nontoxic, afebrile, answering questions appropriately, acting properly for age, in no acute distress  HEENT: Extraocular muscles intact, mucus membranes moist, TMs clear bilaterally, no posterior pharyngeal erythema or exudates, Pupils equal, round, reactive to light,   Neck: Trachea midline, Supple without lymphadenopathy, no posterior midline neck tenderness to palpation  Cardiovascular: Regular rhythm and rate no S3, S4, or murmurs  Respiratory: Clear to auscultation bilaterally no wheezes, rhonchi, rales, no respiratory distress  Gastrointestinal: Soft, nontender, nondistended, positive bowel sounds. No rebound, rigidity, or guarding. Musculoskeletal: No extremity pain or swelling  Neurologic: Moving all 4 extremities without difficulty there are no gross focal neurologic deficits  Skin: Warm and dry    DIFFERENTIAL DIAGNOSIS/ MDM:     Asymptomatic hypertension, patient will be placed in a dark room and reevaluated. DIAGNOSTIC RESULTS     EKG: All EKG's are interpreted by the Emergency Department Physician who either signs or Co-signs this chart in the absence of a cardiologist.        Not indicated unless otherwise documented above    LABS:  No results found for this visit on 12/17/22. Not indicated unless otherwise documented above    RADIOLOGY:   I reviewed the radiologist interpretations:  No orders to display       Not indicated unless otherwise documented above    EMERGENCY DEPARTMENT COURSE:     The patient was given the following medications:  No orders of the defined types were placed in this encounter.        Vitals:    Vitals:    12/17/22 2330 12/17/22 2345 12/18/22 0043   BP: (!) 203/103 (!) 186/110 (!) 162/92   Pulse: 81 Resp: 18     Temp: 98.1 °F (36.7 °C)     TempSrc: Oral     SpO2: 98%     Weight: 95.3 kg (210 lb)     Height: 5' 6\" (1.676 m)       -------------------------  BP (!) 162/92   Pulse 81   Temp 98.1 °F (36.7 °C) (Oral)   Resp 18   Ht 5' 6\" (1.676 m)   Wt 95.3 kg (210 lb)   SpO2 98%   BMI 33.89 kg/m²         I have reviewed the disposition diagnosis with the patient and or their family/guardian. I have answered their questions and given discharge instructions. They voiced understanding of these instructions and did not have any further questions or complaints. CRITICAL CARE:    None    CONSULTS:    None    PROCEDURES:    None      OARRS Report if indicated             FINAL IMPRESSION      1. Hypertension, unspecified type          DISPOSITION/PLAN   DISPOSITION Decision To Discharge    I have reviewed the disposition diagnosis with the patient and or their family/guardian. I have answered their questions and given discharge instructions. They voiced understanding of these instructions and did not have any further questions or complaints. Reevaluation: Patient's pressures come down nicely without any kind of medication intervention patient is stable for discharge home will need to have a consultation with his doctor on Monday to reevaluate whether his medicine is working adequately or not. He is stable for discharge home.       PATIENT REFERRED TO:  Vane Ibarra MD  [facility] [phone]  [phone]    In 2 days      DISCHARGE MEDICATIONS:  New Prescriptions    No medications on file       (Please note that portions of this note were completed with a voice recognition program.  Efforts were made to edit the dictations but occasionally words are mis-transcribed.)    Kalyan Griffiths MD,  Attending Emergency Physician            Kalyan Griffiths MD  12/18/22 5405

## 2022-12-18 NOTE — DISCHARGE INSTRUCTIONS
Today you have been seen for what we call asymptomatic high blood pressure. Your pressure has come down on its own without us giving you any kind of medication after you have been in a dark room. We are recommending that you have a consultation with your family doctor on Monday to review your medication to see if this needs to be changed. Make sure that you continue taking the medicine until you have that consultation with your doctor return back to the emergency setting if you have headache chest pain shortness of breath or dizziness.

## 2022-12-18 NOTE — ED PROVIDER NOTES
[address] [facility] Emergency Department  [address] [address] [facility],[address] [facility] [address]  Phone: [phone]  Fax: Daisha Courtney 3961      Pt Name: Sima Rizvi  MRN: [id number]  [place] 1940  Date of evaluation: 12/18/2022    CHIEF COMPLAINT       Chief Complaint   Patient presents with    Chest Pain     Patient comes in states chest pain today after taking a pill started to develop throat pain that radiated to chest. Patient given x1 SL nitro by EMS states was seen yesterday for same complaint/Hypertension yesterday. HISTORY OF PRESENT ILLNESS    Sima Rizvi is a 80 y.o. male who presents to the emergency department by ambulance for chest pain and hypertension. Patient was seen here about 14 hours ago for high blood pressure which improved over time and was ultimately discharged home no testing at that time. He admits to retrosternal chest pain that he rates 2 out of 10 he did take a nitro given by paramedics which did improve his discomfort as well as helped to bring his blood pressure down. Blood pressure was 230/100 according to paramedics. When he left here early this morning 160s over 90s. He denies any fevers cough congestion or shortness of breath. No abdominal pain. History of colon CA. He has been taking his medications like he is supposed to. He says that he has never had a heart cath or stress test done. He does state that he is currently on antibiotics/clindamycin for a tooth infection and yesterday just before he came because of his blood pressure being high he had just taken his medication and today after taking his medication he then developed this chest burning.     REVIEW OF SYSTEMS       Constitutional: No fevers or chills   HEENT: No sore throat, rhinorrhea, or earache   Eyes: No blurry vision or double vision no drainage   Cardiovascular: Positive chest pain  Respiratory: No wheezing or shortness of breath no cough Gastrointestinal: No nausea, vomiting, diarrhea, constipation, or abdominal pain   : No hematuria or dysuria   Musculoskeletal: No swelling or pain   Skin: No rash   Neurological: No focal neurologic complaints, paresthesias, weakness, or headache     PAST MEDICAL HISTORY    has a past medical history of Choroid melanoma of left eye ([address] Hyperlipidemia, Hypertension, and [place] eye. SURGICAL HISTORY      has a past surgical history that includes Spine surgery; Eye surgery (Left); and colostomy. CURRENT MEDICATIONS       Previous Medications    CLONIDINE (CATAPRES) 0.3 MG TABLET    Take 1 tablet by mouth 2 times daily    CRANBERRY EXTRACT PO    Take  by mouth. FINASTERIDE (PROSCAR) 5 MG TABLET    Take 5 mg by mouth daily. PANTOPRAZOLE (PROTONIX) 20 MG TABLET    Take 20 mg by mouth daily    TAMSULOSIN (FLOMAX) 0.4 MG CAPSULE    Take 0.4 mg by mouth daily. ALLERGIES     is allergic to pcn [penicillins] and iodine. FAMILY HISTORY     has no family status information on file. family history is not on file. SOCIAL HISTORY      reports that he has never smoked. He has never used smokeless tobacco. He reports current alcohol use. He reports that he does not use drugs. PHYSICAL EXAM     BP (!) 163/83   Pulse 59   Temp 98.2 °F (36.8 °C)   Resp 20   Ht 5' 6\" (1.676 m)   Wt 95.3 kg (210 lb)   SpO2 95%   BMI 33.89 kg/m²     Constitutional: Alert, oriented x3, nontoxic, answering questions appropriately, acting properly for age, in no acute distress   HEENT: Extraocular muscles intact,   Neck: Trachea midline   Cardiovascular: Regular rhythm and rate no murmurs   Respiratory: Clear to auscultation bilaterally no wheezes, rhonchi, rales, no respiratory distress no tachypnea no retractions no hypoxia  Gastrointestinal: Soft, nontender, obese, ventral wall hernia. Ostomy with stool, diminished bowel sounds. No rebound, rigidity, or guarding.    Musculoskeletal: No extremity pain no calf tenderness or asymmetry  Neurologic: Moving all 4 extremities without difficulty there are no gross focal neurologic deficits   Skin: Warm and dry       DIFFERENTIAL DIAGNOSIS/ MDM:     Chest pain rule out cardiac etiology. Hypertension monitor and treat. IV labs or EKG. Chest imaging. DIAGNOSTIC RESULTS     EKG: All EKG's are interpreted by the Emergency Department Physician who either signs or Co-signs this chart in the absence of a cardiologist.    1529 sinus rhythm rate 63 first-degree AV block  QRS 88  no acute ST or T wave changes. 1759 sinus rhythm rate 57  first-degree AV block QRS 90  no acute ST or T wave changes.     Not indicated unless otherwise documented above    LABS:  Results for orders placed or performed during the hospital encounter of 12/18/22   Troponin   Result Value Ref Range    Troponin, High Sensitivity 20 0 - 22 ng/L   Troponin   Result Value Ref Range    Troponin, High Sensitivity 20 0 - 22 ng/L   CBC with Auto Differential   Result Value Ref Range    WBC 7.3 3.5 - 11.0 k/uL    RBC 4.11 (L) 4.5 - 5.9 m/uL    Hemoglobin 12.7 (L) 13.5 - 17.5 g/dL    Hematocrit 38.4 (L) 41 - 53 %    MCV 93.3 80 - 100 fL    MCH 30.9 26 - 34 pg    MCHC 33.1 31 - 37 g/dL    RDW 14.9 12.5 - 15.4 %    Platelets 527 661 - 169 k/uL    MPV 8.7 6.0 - 12.0 fL    Seg Neutrophils 71 (H) 36 - 66 %    Lymphocytes 15 (L) 24 - 44 %    Monocytes 9 2 - 11 %    Eosinophils % 4 1 - 4 %    Basophils 1 0 - 2 %    Segs Absolute 5.10 1.8 - 7.7 k/uL    Absolute Lymph # 1.10 1.0 - 4.8 k/uL    Absolute Mono # 0.70 0.1 - 1.2 k/uL    Absolute Eos # 0.30 0.0 - 0.4 k/uL    Basophils Absolute 0.10 0.0 - 0.2 k/uL   Basic Metabolic Panel   Result Value Ref Range    Glucose 93 70 - 99 mg/dL    BUN 19 8 - 23 mg/dL    Creatinine 1.10 0.70 - 1.20 mg/dL    Est, Glom Filt Rate >60 >60 mL/min/1.73m2    Calcium 8.6 8.6 - 10.4 mg/dL    Sodium 139 135 - 144 mmol/L    Potassium 4.2 3.7 - 5.3 mmol/L    Chloride 106 98 - 107 mmol/L    CO2 22 20 - 31 mmol/L    Anion Gap 11 9 - 17 mmol/L   D-Dimer, Quantitative   Result Value Ref Range    D-Dimer, Quant 0.48 mg/L FEU   Brain Natriuretic Peptide   Result Value Ref Range    Pro- (H) <300 pg/mL   EKG 12 Lead   Result Value Ref Range    Ventricular Rate 57 BPM    Atrial Rate 57 BPM    P-R Interval 218 ms    QRS Duration 90 ms    Q-T Interval 472 ms    QTc Calculation (Bazett) 459 ms    P Axis 41 degrees    R Axis -14 degrees    T Axis 18 degrees       Not indicated unless otherwise documented above    RADIOLOGY:   I reviewed the radiologist interpretations:    XR CHEST PORTABLE   Final Result   No acute abnormality. Not indicated unless otherwise documented above    EMERGENCY DEPARTMENT COURSE:     The patient was given the following medications:  Orders Placed This Encounter   Medications    aspirin chewable tablet 324 mg    aluminum & magnesium hydroxide-simethicone (MAALOX) 200-200-20 MG/5ML suspension 30 mL          Vitals:   -------------------------  BP (!) 163/96   Pulse 62   Temp 98.2 °F (36.8 °C)   Resp 20   Ht 5' 6\" (1.676 m)   Wt 95.3 kg (210 lb)   SpO2 97%   BMI 33.89 kg/m²     4:15 PM blood pressure continues to improve. Labs normal CBC with exception of a mild anemia hemoglobin of 12.7. D-dimer of 0.48 we will obtain a chest x-ray. 5 PM resting comfortably no acute distress awaiting second troponin EKG. Chest x-ray no abnormality. Blood pressure improved 160s over 80s. Family member at bedside now. Patient again states that this happened just after taking his clindamycin. He also states that he had a drink of water while he was here in the emergency department and developed this burning sensation as it went down similar to the pain he had earlier. Currently pain 1 out of 10.    6:40 PM repeat EKG unremarkable. Repeat troponin of 20 no change from the previous.   Patient states that he did have some burning when he drank some water again suspect esophagitis. Will give Maalox and ultimately discharged home. Low suspicion for cardiac etiology. Son at bedside. Will discharge and return if worsening symptoms or any other concerns. Blood pressure improved he still needs to take his medications tonight    The patient and his son understands that at this time there is no evidence for a more malignant underlying process, but also understands that early in the process of an illness or injury, an emergency department workup can be falsely reassuring. Routine discharge counseling was given, and it is understood that worsening, changing or persistent symptoms should prompt an immediate call or follow up with their primary physician or return to the emergency department. The importance of appropriate follow up was also discussed. I have reviewed the disposition diagnosis. I have answered the questions and given discharge instructions. There was voiced understanding of these instructions and no further questions or complaints. CRITICAL CARE:    None    CONSULTS:    None    PROCEDURES:    None      OARRS Report if indicated             FINAL IMPRESSION      1. Esophagitis    2. Chest pain, unspecified type    3.  Hypertension, unspecified type          DISPOSITION/PLAN   DISPOSITION Discharge - Pending Orders Complete 12/18/2022 06:42:02 PM        CONDITION ON DISPOSITION: STABLE       PATIENT REFERRED TO:  Geno Marvin MD  [place] [address] [address]  [phone]    Schedule an appointment as soon as possible for a visit in 1 day      DISCHARGE MEDICATIONS:  New Prescriptions    No medications on file       (Please note that portions of this note were completed with a voice recognition program.  Efforts were made to edit the dictations but occasionally words are mis-transcribed.)    Laure Alvarez DO   Attending Emergency Physician     Laure Alvarez, [address] [place] [address]  12/18/22 3454

## 2022-12-18 NOTE — DISCHARGE INSTRUCTIONS
Continue medications as prescribed    Return immediately if any worsening symptoms or any other concerns    Tell us how we did visit: [email or web address]   and let us know about your experience

## 2022-12-19 LAB
EKG ATRIAL RATE: 57 BPM
EKG ATRIAL RATE: 63 BPM
EKG P AXIS: 41 DEGREES
EKG P AXIS: 44 DEGREES
EKG P-R INTERVAL: 212 MS
EKG P-R INTERVAL: 218 MS
EKG Q-T INTERVAL: 450 MS
EKG Q-T INTERVAL: 472 MS
EKG QRS DURATION: 88 MS
EKG QRS DURATION: 90 MS
EKG QTC CALCULATION (BAZETT): 459 MS
EKG QTC CALCULATION (BAZETT): 460 MS
EKG R AXIS: -14 DEGREES
EKG R AXIS: -9 DEGREES
EKG T AXIS: 18 DEGREES
EKG T AXIS: 22 DEGREES
EKG VENTRICULAR RATE: 57 BPM
EKG VENTRICULAR RATE: 63 BPM

## 2023-02-06 ENCOUNTER — HOSPITAL ENCOUNTER (INPATIENT)
Age: 83
LOS: 1 days | Discharge: HOME | DRG: 395 | End: 2023-02-08
Attending: SPECIALIST | Admitting: STUDENT IN AN ORGANIZED HEALTH CARE EDUCATION/TRAINING PROGRAM
Payer: MEDICARE

## 2023-02-06 DIAGNOSIS — K56.609 SMALL BOWEL OBSTRUCTION (HCC): Primary | ICD-10-CM

## 2023-02-06 PROCEDURE — 99285 EMERGENCY DEPT VISIT HI MDM: CPT

## 2023-02-07 ENCOUNTER — APPOINTMENT (OUTPATIENT)
Dept: CT IMAGING | Age: 83
DRG: 395 | End: 2023-02-07
Payer: MEDICARE

## 2023-02-07 PROBLEM — K56.609 SMALL BOWEL OBSTRUCTION (HCC): Status: ACTIVE | Noted: 2023-02-07

## 2023-02-07 PROBLEM — K56.609 SBO (SMALL BOWEL OBSTRUCTION) (HCC): Status: ACTIVE | Noted: 2023-02-07

## 2023-02-07 PROBLEM — Z85.038 HISTORY OF COLON CANCER: Status: ACTIVE | Noted: 2023-02-07

## 2023-02-07 PROBLEM — F41.9 ANXIETY: Status: ACTIVE | Noted: 2023-02-07

## 2023-02-07 LAB
ABSOLUTE EOS #: 0.14 K/UL (ref 0–0.4)
ABSOLUTE LYMPH #: 0.73 K/UL (ref 1–4.8)
ABSOLUTE MONO #: 0.86 K/UL (ref 0.1–1.2)
ALBUMIN SERPL-MCNC: 3.9 G/DL (ref 3.5–5.2)
ALBUMIN/GLOBULIN RATIO: 1.3 (ref 1–2.5)
ALP SERPL-CCNC: 64 U/L (ref 40–129)
ALT SERPL-CCNC: 6 U/L (ref 5–41)
ANION GAP SERPL CALCULATED.3IONS-SCNC: 9 MMOL/L (ref 9–17)
AST SERPL-CCNC: 9 U/L
BACTERIA: ABNORMAL
BASOPHILS # BLD: 0 % (ref 0–2)
BASOPHILS ABSOLUTE: 0.03 K/UL (ref 0–0.2)
BILIRUB SERPL-MCNC: 0.6 MG/DL (ref 0.3–1.2)
BILIRUBIN URINE: NEGATIVE
BUN SERPL-MCNC: 21 MG/DL (ref 8–23)
CALCIUM SERPL-MCNC: 9 MG/DL (ref 8.6–10.4)
CHLORIDE SERPL-SCNC: 107 MMOL/L (ref 98–107)
CO2 SERPL-SCNC: 22 MMOL/L (ref 20–31)
COLOR: YELLOW
CREAT SERPL-MCNC: 0.96 MG/DL (ref 0.7–1.2)
EOSINOPHILS RELATIVE PERCENT: 1 % (ref 1–4)
EPITHELIAL CELLS UA: ABNORMAL /HPF (ref 0–5)
GFR SERPL CREATININE-BSD FRML MDRD: >60 ML/MIN/1.73M2
GLUCOSE SERPL-MCNC: 129 MG/DL (ref 70–99)
GLUCOSE UR STRIP.AUTO-MCNC: NEGATIVE MG/DL
HCT VFR BLD AUTO: 41.5 % (ref 41–53)
HGB BLD-MCNC: 13.7 G/DL (ref 13.5–17.5)
KETONES UR STRIP.AUTO-MCNC: NEGATIVE MG/DL
LACTATE PLASV-SCNC: 1.3 MMOL/L (ref 0.5–2.2)
LEUKOCYTE ESTERASE UR QL STRIP.AUTO: NEGATIVE
LIPASE SERPL-CCNC: 15 U/L (ref 13–60)
LYMPHOCYTES # BLD: 6 % (ref 24–44)
MCH RBC QN AUTO: 30.9 PG (ref 26–34)
MCHC RBC AUTO-ENTMCNC: 33 G/DL (ref 31–37)
MCV RBC AUTO: 93.5 FL (ref 80–100)
MONOCYTES # BLD: 8 % (ref 2–11)
NITRITE UR QL STRIP.AUTO: NEGATIVE
OTHER OBSERVATIONS UA: ABNORMAL
PDW BLD-RTO: 14.3 % (ref 12.5–15.4)
PLATELET # BLD AUTO: 263 K/UL (ref 140–450)
PMV BLD AUTO: 10.4 FL (ref 8–14)
POTASSIUM SERPL-SCNC: 4.5 MMOL/L (ref 3.7–5.3)
PROT SERPL-MCNC: 6.9 G/DL (ref 6.4–8.3)
PROT UR STRIP.AUTO-MCNC: 5.5 MG/DL (ref 5–8)
PROT UR STRIP.AUTO-MCNC: NEGATIVE MG/DL
RBC # BLD: 4.44 M/UL (ref 4.5–5.9)
RBC CLUMPS #/AREA URNS AUTO: ABNORMAL /HPF (ref 0–2)
SEG NEUTROPHILS: 85 % (ref 36–66)
SEGMENTED NEUTROPHILS ABSOLUTE COUNT: 9.62 K/UL (ref 1.8–7.7)
SODIUM SERPL-SCNC: 138 MMOL/L (ref 135–144)
SPECIFIC GRAVITY UA: 1.02 (ref 1–1.03)
TROPONIN I SERPL DL<=0.01 NG/ML-MCNC: 22 NG/L (ref 0–22)
TURBIDITY: CLEAR
URINE HGB: ABNORMAL
UROBILINOGEN, URINE: NORMAL
WBC # BLD AUTO: 11.4 K/UL (ref 3.5–11)
WBC UA: ABNORMAL /HPF (ref 0–5)

## 2023-02-07 PROCEDURE — 80053 COMPREHEN METABOLIC PANEL: CPT

## 2023-02-07 PROCEDURE — 83690 ASSAY OF LIPASE: CPT

## 2023-02-07 PROCEDURE — 2060000000 HC ICU INTERMEDIATE R&B

## 2023-02-07 PROCEDURE — 83605 ASSAY OF LACTIC ACID: CPT

## 2023-02-07 PROCEDURE — 93005 ELECTROCARDIOGRAM TRACING: CPT | Performed by: STUDENT IN AN ORGANIZED HEALTH CARE EDUCATION/TRAINING PROGRAM

## 2023-02-07 PROCEDURE — 85025 COMPLETE CBC W/AUTO DIFF WBC: CPT

## 2023-02-07 PROCEDURE — 2580000003 HC RX 258: Performed by: NURSE PRACTITIONER

## 2023-02-07 PROCEDURE — 6360000002 HC RX W HCPCS: Performed by: NURSE PRACTITIONER

## 2023-02-07 PROCEDURE — 84484 ASSAY OF TROPONIN QUANT: CPT

## 2023-02-07 PROCEDURE — 81001 URINALYSIS AUTO W/SCOPE: CPT

## 2023-02-07 PROCEDURE — 6360000002 HC RX W HCPCS: Performed by: STUDENT IN AN ORGANIZED HEALTH CARE EDUCATION/TRAINING PROGRAM

## 2023-02-07 PROCEDURE — 99222 1ST HOSP IP/OBS MODERATE 55: CPT | Performed by: SURGERY

## 2023-02-07 PROCEDURE — 74176 CT ABD & PELVIS W/O CONTRAST: CPT

## 2023-02-07 PROCEDURE — 2580000003 HC RX 258: Performed by: SPECIALIST

## 2023-02-07 RX ORDER — SODIUM CHLORIDE 0.9 % (FLUSH) 0.9 %
10 SYRINGE (ML) INJECTION PRN
Status: DISCONTINUED | OUTPATIENT
Start: 2023-02-07 | End: 2023-02-08 | Stop reason: HOSPADM

## 2023-02-07 RX ORDER — ACETAMINOPHEN 325 MG/1
650 TABLET ORAL EVERY 6 HOURS PRN
Status: DISCONTINUED | OUTPATIENT
Start: 2023-02-07 | End: 2023-02-08 | Stop reason: HOSPADM

## 2023-02-07 RX ORDER — LABETALOL HYDROCHLORIDE 5 MG/ML
10 INJECTION, SOLUTION INTRAVENOUS ONCE
Status: COMPLETED | OUTPATIENT
Start: 2023-02-08 | End: 2023-02-08

## 2023-02-07 RX ORDER — SODIUM CHLORIDE 0.9 % (FLUSH) 0.9 %
5-40 SYRINGE (ML) INJECTION EVERY 12 HOURS SCHEDULED
Status: DISCONTINUED | OUTPATIENT
Start: 2023-02-07 | End: 2023-02-08 | Stop reason: HOSPADM

## 2023-02-07 RX ORDER — HYDRALAZINE HYDROCHLORIDE 20 MG/ML
5 INJECTION INTRAMUSCULAR; INTRAVENOUS EVERY 6 HOURS PRN
Status: DISCONTINUED | OUTPATIENT
Start: 2023-02-07 | End: 2023-02-08

## 2023-02-07 RX ORDER — SODIUM CHLORIDE 9 MG/ML
INJECTION, SOLUTION INTRAVENOUS CONTINUOUS
Status: DISCONTINUED | OUTPATIENT
Start: 2023-02-07 | End: 2023-02-08

## 2023-02-07 RX ORDER — 0.9 % SODIUM CHLORIDE 0.9 %
500 INTRAVENOUS SOLUTION INTRAVENOUS ONCE
Status: COMPLETED | OUTPATIENT
Start: 2023-02-07 | End: 2023-02-07

## 2023-02-07 RX ORDER — LORAZEPAM 2 MG/ML
1 INJECTION INTRAMUSCULAR ONCE
Status: COMPLETED | OUTPATIENT
Start: 2023-02-07 | End: 2023-02-07

## 2023-02-07 RX ORDER — PROCHLORPERAZINE EDISYLATE 5 MG/ML
10 INJECTION INTRAMUSCULAR; INTRAVENOUS ONCE
Status: COMPLETED | OUTPATIENT
Start: 2023-02-07 | End: 2023-02-07

## 2023-02-07 RX ORDER — FERROUS SULFATE 325(65) MG
325 TABLET ORAL DAILY
COMMUNITY

## 2023-02-07 RX ORDER — ONDANSETRON 4 MG/1
4 TABLET, ORALLY DISINTEGRATING ORAL EVERY 8 HOURS PRN
Status: DISCONTINUED | OUTPATIENT
Start: 2023-02-07 | End: 2023-02-08 | Stop reason: HOSPADM

## 2023-02-07 RX ORDER — CYANOCOBALAMIN 1000 UG/ML
1000 INJECTION, SOLUTION INTRAMUSCULAR; SUBCUTANEOUS ONCE
COMMUNITY

## 2023-02-07 RX ORDER — LISINOPRIL 10 MG/1
10 TABLET ORAL DAILY
COMMUNITY

## 2023-02-07 RX ORDER — SODIUM CHLORIDE 0.9 % (FLUSH) 0.9 %
3 SYRINGE (ML) INJECTION EVERY 8 HOURS
Status: DISCONTINUED | OUTPATIENT
Start: 2023-02-07 | End: 2023-02-08 | Stop reason: HOSPADM

## 2023-02-07 RX ORDER — ONDANSETRON 2 MG/ML
4 INJECTION INTRAMUSCULAR; INTRAVENOUS EVERY 6 HOURS PRN
Status: DISCONTINUED | OUTPATIENT
Start: 2023-02-07 | End: 2023-02-08 | Stop reason: HOSPADM

## 2023-02-07 RX ORDER — SODIUM CHLORIDE 9 MG/ML
INJECTION, SOLUTION INTRAVENOUS PRN
Status: DISCONTINUED | OUTPATIENT
Start: 2023-02-07 | End: 2023-02-08 | Stop reason: HOSPADM

## 2023-02-07 RX ADMIN — PROCHLORPERAZINE EDISYLATE 10 MG: 5 INJECTION INTRAMUSCULAR; INTRAVENOUS at 17:18

## 2023-02-07 RX ADMIN — HYDROMORPHONE HYDROCHLORIDE 0.25 MG: 1 INJECTION, SOLUTION INTRAMUSCULAR; INTRAVENOUS; SUBCUTANEOUS at 13:53

## 2023-02-07 RX ADMIN — ONDANSETRON 4 MG: 2 INJECTION INTRAMUSCULAR; INTRAVENOUS at 06:40

## 2023-02-07 RX ADMIN — LORAZEPAM 1 MG: 2 INJECTION INTRAMUSCULAR; INTRAVENOUS at 21:34

## 2023-02-07 RX ADMIN — HYDRALAZINE HYDROCHLORIDE 5 MG: 20 INJECTION INTRAMUSCULAR; INTRAVENOUS at 06:32

## 2023-02-07 RX ADMIN — SODIUM CHLORIDE, PRESERVATIVE FREE 10 ML: 5 INJECTION INTRAVENOUS at 21:41

## 2023-02-07 RX ADMIN — SODIUM CHLORIDE 500 ML: 9 INJECTION, SOLUTION INTRAVENOUS at 01:30

## 2023-02-07 RX ADMIN — HYDRALAZINE HYDROCHLORIDE 5 MG: 20 INJECTION INTRAMUSCULAR; INTRAVENOUS at 21:38

## 2023-02-07 RX ADMIN — SODIUM CHLORIDE: 9 INJECTION, SOLUTION INTRAVENOUS at 05:19

## 2023-02-07 RX ADMIN — HYDROMORPHONE HYDROCHLORIDE 0.25 MG: 1 INJECTION, SOLUTION INTRAMUSCULAR; INTRAVENOUS; SUBCUTANEOUS at 06:32

## 2023-02-07 ASSESSMENT — PAIN SCALES - GENERAL
PAINLEVEL_OUTOF10: 7
PAINLEVEL_OUTOF10: 5
PAINLEVEL_OUTOF10: 7
PAINLEVEL_OUTOF10: 8

## 2023-02-07 ASSESSMENT — PAIN DESCRIPTION - PAIN TYPE
TYPE: ACUTE PAIN
TYPE: ACUTE PAIN

## 2023-02-07 ASSESSMENT — ENCOUNTER SYMPTOMS
COUGH: 0
ABDOMINAL DISTENTION: 1
SORE THROAT: 0
VOMITING: 0
ABDOMINAL PAIN: 1
BLOOD IN STOOL: 0
NAUSEA: 1
SHORTNESS OF BREATH: 0

## 2023-02-07 ASSESSMENT — PAIN DESCRIPTION - DESCRIPTORS: DESCRIPTORS: ACHING

## 2023-02-07 ASSESSMENT — PAIN DESCRIPTION - LOCATION
LOCATION: ABDOMEN

## 2023-02-07 ASSESSMENT — PAIN DESCRIPTION - ORIENTATION
ORIENTATION: MID;LOWER;LEFT
ORIENTATION: MID;LOWER;LEFT

## 2023-02-07 ASSESSMENT — PAIN - FUNCTIONAL ASSESSMENT: PAIN_FUNCTIONAL_ASSESSMENT: 0-10

## 2023-02-07 NOTE — PLAN OF CARE
Problem: Discharge Planning  Goal: Discharge to home or other facility with appropriate resources  Outcome: Progressing  Flowsheets (Taken 2/7/2023 1030)  Discharge to home or other facility with appropriate resources:   Identify barriers to discharge with patient and caregiver   Arrange for needed discharge resources and transportation as appropriate   Identify discharge learning needs (meds, wound care, etc)   Arrange for interpreters to assist at discharge as needed   Refer to discharge planning if patient needs post-hospital services based on physician order or complex needs related to functional status, cognitive ability or social support system     Problem: Pain  Goal: Verbalizes/displays adequate comfort level or baseline comfort level  Outcome: Progressing     Problem: ABCDS Injury Assessment  Goal: Absence of physical injury  Outcome: Progressing  Flowsheets (Taken 2/7/2023 1030)  Absence of Physical Injury: Implement safety measures based on patient assessment     Problem: Safety - Adult  Goal: Free from fall injury  Outcome: Progressing  Flowsheets (Taken 2/7/2023 1030)  Free From Fall Injury:   Instruct family/caregiver on patient safety   Based on caregiver fall risk screen, instruct family/caregiver to ask for assistance with transferring infant if caregiver noted to have fall risk factors

## 2023-02-07 NOTE — ED NOTES
[employer] Text To:    Italia pAple DO   Patient:   Juan Perla    YOB: 1940  MRN:   [id number]  Location: [facility]    [id number]   2/7/23 3:50 AM  [phone] From: Dr Mohsen Martinez [facility] Emergency Department [place] RE: Viridiana Decant Bowel Obstruction, Please call back  Win Bell RN  02/07/23 [phone]

## 2023-02-07 NOTE — ED NOTES
[employer] Text To:  MIGNON Velez   Patient:   Gregory See    YOB: 1940  MRN:   [id number]  Location: [place] [address]   2/7/23 4:09 AM  [phone] Hospital or Facility: [facility] Emergency Department [place] NEW ADMISSION From: Dr Mario Sofia RE: [facility]: [id number] Admit to [facility] 210, RN  02/07/23 [address] [place] [address], RN  02/07/23 2925

## 2023-02-07 NOTE — PROGRESS NOTES
Physical Therapy        Physical Therapy Cancel Note      DATE: 2023    NAME: Felipe Sprague  MRN: [id number]   : 1940      Patient not seen this date for Physical Therapy: The patient reports that he is independent and would not like evaluation at this time; PT and OT observed the patient ambulating, transferring, and performing bed mobility independently in the room. No acute PT needs at this time. PT will sign off, however if new needs arise please re-order PT.        Electronically signed by Radha Taylor PT on 2023 at 11:04 AM

## 2023-02-07 NOTE — PROGRESS NOTES
Occupational [address]  Occupational Therapy Not Seen Note    DATE: 2023    NAME: Sanjuana Burton  MRN: [id number]   : 1940      Patient not seen this date for Occupational Therapy due to:      Patient reports independent with ADLs and functional tasks with no acute OT needs. Pt demo independent bed mobility and short distance of functional mobility within hospital room. Pt wishes for no acute care therapy services, pt educated in the importance of activity and therapy services while hospitalized, pt verbalizes understanding however maintains wishes for no acute care therapy. Please reorder OT if future needs arise.        Electronically signed by Bruce Vann OT on 2023 at 10:41 AM

## 2023-02-07 NOTE — ED PROVIDER NOTES
Jeremy Rosario 1778 ENCOUNTER      Pt Name: Alaina Trinidad  MRN: [id number]  [place] 1940  Date of evaluation: 2/7/23      CHIEF COMPLAINT       Chief Complaint   Patient presents with    Abdominal Pain     PAtient has a Colostomy to lower left abd. States he had [place] food yesterday and today has not had stool in his colostomy today. HISTORY OF PRESENT ILLNESS    Alaina Trinidad is a 80 y.o. male who presents to the emergency department for evaluation of generalized abdominal pain mostly in the mid and upper abdomen associated with abdominal distention, nausea and decreased appetite since 9 AM yesterday morning. Patient denies any vomiting, fever or chills. He has colostomy bag in the left upper quadrant and has no output through the colostomy bag since 11 PM on Sunday night February 5, 2023. Normally he changes the bag about 4 times daily. He denies any melena or hematochezia. Abdominal pain is worse with any oral food or fluid intake. He denies any urinary frequency, urgency, dysuria or hematuria. Patient has history of colon cancer diagnosed about 3 years ago when he underwent surgical intervention and had failed reversal of the colostomy bag about 1 year later. Surgeries were done at [facility].  He denies any cough, chest pain, shortness of breath, lightheadedness, dizziness, palpitations or diaphoresis. He denies any upper or lower back pain or flank pain. He grades pain as 8 out of 10 in intensity at home and it has decreased down to 3 out of 10 intensity upon arrival.  Patient states he has ventral hernias at several different places in the abdominal wall which need repair. REVIEW OF SYSTEMS       Review of Systems   Constitutional:  Positive for appetite change. Negative for chills, diaphoresis and fever. HENT:  Negative for congestion and sore throat. Respiratory:  Negative for cough and shortness of breath.     Cardiovascular: Negative for chest pain and palpitations. Gastrointestinal:  Positive for abdominal distention, abdominal pain and nausea. Negative for blood in stool and vomiting. Genitourinary:  Negative for dysuria and hematuria. Neurological:  Negative for dizziness and light-headedness. All other systems reviewed and are negative. PAST MEDICAL HISTORY    has a past medical history of Choroid melanoma of left eye ([address] Hyperlipidemia, Hypertension, and [place] eye. SURGICAL HISTORY      has a past surgical history that includes Spine surgery; Eye surgery (Left); and colostomy. CURRENT MEDICATIONS       Previous Medications    CLONIDINE (CATAPRES) 0.3 MG TABLET    Take 1 tablet by mouth 2 times daily    CRANBERRY EXTRACT PO    Take  by mouth. FINASTERIDE (PROSCAR) 5 MG TABLET    Take 5 mg by mouth daily. PANTOPRAZOLE (PROTONIX) 20 MG TABLET    Take 20 mg by mouth daily    TAMSULOSIN (FLOMAX) 0.4 MG CAPSULE    Take 0.4 mg by mouth daily. ALLERGIES     is allergic to pcn [penicillins] and iodine. FAMILY HISTORY     has no family status information on file. family history is not on file. SOCIAL HISTORY      reports that he has never smoked. He has never used smokeless tobacco. He reports current alcohol use. He reports that he does not use drugs. PHYSICAL EXAM     INITIAL VITALS:  height is 5' 5\" (1.651 m) and weight is 95.3 kg (210 lb). His oral temperature is 99.6 °F (37.6 °C). His blood pressure is 168/82 (abnormal) and his pulse is 68. His respiration is 19 and oxygen saturation is 96%. Physical Exam  Vitals and nursing note reviewed. Constitutional:       General: He is not in acute distress. Appearance: He is well-developed. HENT:      Head: Normocephalic and atraumatic. Nose: Nose normal.   Eyes:      Extraocular Movements: Extraocular movements intact. Pupils: Pupils are equal, round, and reactive to light.    Cardiovascular:      Rate and Rhythm: Normal rate and regular rhythm. Heart sounds: Normal heart sounds. No murmur heard. Pulmonary:      Effort: Pulmonary effort is normal. No respiratory distress. Breath sounds: Normal breath sounds. Abdominal:      General: Bowel sounds are decreased. There is distension. There is no abdominal bruit. Palpations: Abdomen is soft. There is no pulsatile mass. Tenderness: There is abdominal tenderness in the epigastric area and periumbilical area. There is no right CVA tenderness, left CVA tenderness or rebound. Negative signs include Brown's sign and McBurney's sign. Hernia: A hernia is present. Hernia is present in the ventral area. Musculoskeletal:      Cervical back: Normal range of motion and neck supple. Skin:     General: Skin is warm and dry. Neurological:      General: No focal deficit present. Mental Status: He is alert and oriented to person, place, and time. Psychiatric:         Behavior: Behavior normal.         Thought Content: Thought content normal.         DIFFERENTIAL DIAGNOSIS/ MDM:     Differential diagnosis considered: Cholecystitis, appendicitis, pancreatitis,  urinary tract infection, renal stone, small bowel obstruction,diverticulitis, gastritis, enteritis, colitis. Chronic Conditions affecting care (DM,HTN,CA, etc):  Hypertension, hyperlipidemia, history of skin cancer and choroid melanoma of the left eye    Social Determinants of Health affecting care (unable to care for self, lives alone, unemployed, homeless,etc): Patient lives at home and is able to take care of himself    History source(s) (patient,spouse,parent,family,friend,EMS,etc): Patient    Review of external sources (ECF,Hospital records,EMS report, radiology reports, etc): Hospital records    Tests considered but not ordered: See below    Independent interpretation of tests (eg.  X-ray, CAT scan, Doppler studies, EKG): See below    Discussion of x-ray results with radiology: See below    Consults: See below    Consideration for admission/observation (even if discharged): Considered admission, final decision will be made based on the test results and patient's status. Prescription considerations: See below    Sepsis considered: No evidence of bacteremia or sepsis at this time    Critical Care note written: See below    DIAGNOSTIC RESULTS     EKG: All EKG's are interpreted by the Emergency Department Physician who either signs or Co-signs this chart in the absence of a cardiologist.    EKG Interpretation    Interpreted by emergency department physician    Rhythm: normal sinus   Rate: normal  Axis: normal  Ectopy: none  Conduction: 1st degree AV block  ST Segments: no acute change  T Waves: no acute change  Q Waves: none    Clinical Impression: non-specific EKG    Willetta Angelucci, MD     RADIOLOGY:   Interpretation per the Radiologist below, if available at the time of this note:    CT ABDOMEN PELVIS WO CONTRAST Additional Contrast? None    Result Date: 2/7/2023  Cholelithiasis. There are few small calculi in gallbladder. There is no inflammatory process around gallbladder. Evidence of previous surgical resection of descending colon and sigmoid colon. There is left lower quadrant colostomy into which there is extension of the left side of transverse colon. Evidence of mild diverticulosis of the transverse colon without diverticulitis. Also findings suggestive of previous surgical resection of cecum and right ileocolic anastomosis. On the right side of the colostomy site in the left paraumbilical location there is moderately prominent hernia containing loops of small bowel. Within this hernia there is partial obstruction of small bowel. The loops of small bowel within the hernia appears to contain semi solid stool-like material with mild to moderate distention.   There is another smaller right paraumbilical hernia containing loop of small bowel which also contain semi solid stool-like material.  At these hernias, there is partial small bowel obstruction. Evidence of small bowel dilation with multiple fluid levels proximal to the hernias. No obstruction in colon through colostomy site. No diagnostic finding in liver, spleen, pancreas and kidneys. No obstructive uropathy. No localized abnormal fluid collection or inflammation or abscess in the abdomen or in the pelvis.          LABS:  Results for orders placed or performed during the hospital encounter of 02/06/23   CBC with Diff   Result Value Ref Range    WBC 11.4 (H) 3.5 - 11.0 k/uL    RBC 4.44 (L) 4.5 - 5.9 m/uL    Hemoglobin 13.7 13.5 - 17.5 g/dL    Hematocrit 41.5 41 - 53 %    MCV 93.5 80 - 100 fL    MCH 30.9 26 - 34 pg    MCHC 33.0 31 - 37 g/dL    RDW 14.3 12.5 - 15.4 %    Platelets 461 846 - 587 k/uL    MPV 10.4 8.0 - 14.0 fL    Seg Neutrophils 85 (H) 36 - 66 %    Lymphocytes 6 (L) 24 - 44 %    Monocytes 8 2 - 11 %    Eosinophils % 1 1 - 4 %    Basophils 0 0 - 2 %    Segs Absolute 9.62 (H) 1.8 - 7.7 k/uL    Absolute Lymph # 0.73 (L) 1.0 - 4.8 k/uL    Absolute Mono # 0.86 0.1 - 1.2 k/uL    Absolute Eos # 0.14 0.0 - 0.4 k/uL    Basophils Absolute 0.03 0.0 - 0.2 k/uL   CMP   Result Value Ref Range    Glucose 129 (H) 70 - 99 mg/dL    BUN 21 8 - 23 mg/dL    Creatinine 0.96 0.70 - 1.20 mg/dL    Est, Glom Filt Rate >60 >60 mL/min/1.73m2    Calcium 9.0 8.6 - 10.4 mg/dL    Sodium 138 135 - 144 mmol/L    Potassium 4.5 3.7 - 5.3 mmol/L    Chloride 107 98 - 107 mmol/L    CO2 22 20 - 31 mmol/L    Anion Gap 9 9 - 17 mmol/L    Alkaline Phosphatase 64 40 - 129 U/L    ALT 6 5 - 41 U/L    AST 9 <40 U/L    Total Bilirubin 0.6 0.3 - 1.2 mg/dL    Total Protein 6.9 6.4 - 8.3 g/dL    Albumin 3.9 3.5 - 5.2 g/dL    Albumin/Globulin Ratio 1.3 1.0 - 2.5   Lipase   Result Value Ref Range    Lipase 15 13 - 60 U/L   Lactic Acid (Select if patient is over 65 to rule out mesenteric ischemia)   Result Value Ref Range    Lactic Acid 1.3 0.5 - 2.2 mmol/L   Urinalysis with Reflex to Culture Specimen: Urine   Result Value Ref Range    Color, UA Yellow Yellow    Turbidity UA Clear Clear    Glucose, Ur NEGATIVE NEGATIVE    Bilirubin Urine NEGATIVE NEGATIVE    Ketones, Urine NEGATIVE NEGATIVE    Specific Gravity, UA 1.025 1.005 - 1.030    Urine Hgb TRACE (A) NEGATIVE    pH, UA 5.5 5.0 - 8.0    Protein, UA NEGATIVE NEGATIVE    Urobilinogen, Urine Normal Normal    Nitrite, Urine NEGATIVE NEGATIVE    Leukocyte Esterase, Urine NEGATIVE NEGATIVE   Troponin   Result Value Ref Range    Troponin, High Sensitivity 22 0 - 22 ng/L   Microscopic Urinalysis   Result Value Ref Range    WBC, UA 2 TO 5 0 - 5 /HPF    RBC, UA 0 TO 2 0 - 2 /HPF    Epithelial Cells UA 0 TO 2 0 - 5 /HPF    Bacteria, UA FEW (A) None    Other Observations UA (A) NOT REQ. Utilizing a urinalysis as the only screening method to exclude a potential uropathogen can be unreliable in many patient populations. Rapid screening tests are less sensitive than culture and if UTI is a clinical possibility, culture should be considered despite a negative urinalysis. Reviewed all the lab results, patient has leukocytosis with 85% neutrophils, rest of the lab work is unremarkable    EMERGENCY DEPARTMENT COURSE:   Vitals:    Vitals:    02/07/23 0001 02/07/23 0346 02/07/23 0351   BP: (!) 171/85  (!) 168/82   Pulse: 71  68   Resp:  19    Temp: 99.6 °F (37.6 °C)     TempSrc: Oral     SpO2:  96%    Weight: 95.3 kg (210 lb)     Height: 5' 5\" (1.651 m)       -------------------------  BP: (!) 168/82, Temp: 99.6 °F (37.6 °C), Heart Rate: 68, Resp: 19    Orders Placed This Encounter   Medications    sodium chloride flush 0.9 % injection 3 mL    0.9 % sodium chloride bolus         During the emergency department course, patient was given normal saline bolus followed by infusion. Patient did not require any medications for the pain or nausea. Patient will benefit from hospitalization for further evaluation of small bowel obstruction.   I discussed the case with Dr. Argenis Bhatti who recommended that patient may need medical management at this time. Patient does not have surgical abdomen at this time. I discussed the case with Chriss Hoffman CNP covering for hospitalist group and she kindly accepted the patient to be admitted on medical floor. Nasogastric tube is being inserted to low intermittent suction. CONSULTS:  Chriss Hoffman CNP    PROCEDURES:  None    FINAL IMPRESSION      1. Small bowel obstruction (HCC)          DISPOSITION/PLAN       PATIENT REFERRED TO:  No follow-up provider specified. DISCHARGE MEDICATIONS:  New Prescriptions    No medications on file       (Please note that portions of this note were completed with a voice recognition program.  Efforts were made to edit the dictations but occasionally words are mis-transcribed.)    Jacinda Varner MD,, MD, F.A.C.E.P.   Attending Emergency Medicine Physician       Jacinda Varner MD  02/07/23 1193

## 2023-02-07 NOTE — CONSULTS
[address]      Patient's Name/ Date of Birth/ Gender: Oliver Rico / [birth year] (80 y.o.) / male     Referring Physician: Shabbir Garcia MD    Consulting Physician: Dr. Jennifer Glass     CC: incarcerated ventral/incisional hernias, obstipation    History of present Illness: Oliver Rico is a 80 y.o. male, presented to ED last night with complaint of abdominal distention and decrease in colostomy output. PSH includes colon resection with colostomy creation for colon cancer, this was done at [facility], pt is clear that he does not want to return to that hospital nor see his prior surgeons. On interview, pt reports overall feeling better, no flatus in colostomy bag, NGT is now in place. Past Medical History:  has a past medical history of Choroid melanoma of left eye ([address] Hyperlipidemia, Hypertension, and [place] eye. Past Surgical History:   Past Surgical History:   Procedure Laterality Date    COLOSTOMY      EYE SURGERY Left     for cancer removal    SPINE SURGERY         Social History:  reports that he has never smoked. He has never used smokeless tobacco. He reports current alcohol use. He reports that he does not use drugs. Family History: family history is not on file. Review of Systems:   General: Denies fever, chills, night sweats, weight loss, malaise, fatigue  HEENT: Denies sore throat, sinus problems, allergic rhinosinusitis  Card: Denies chest pain, palpitations, orthopnea/PND. Denies h/o murmurs  Pulm: Denies cough, shortness of breath, [place]  GI:  obstipation  : Denies polyuria, dysuria, hematuria  Endo: Denies diabetes, thyroid problems. Heme: Denies anemia, h/o bleeding or clotting problems. Neuro: Denies h/o CVA, TIA  Skin: Denies rashes, ulcers  Musculoskeletal: Denies muscle, joint, back pain.     Allergies: Pcn [penicillins] and Iodine    Current Meds:  Current Facility-Administered Medications:     Saline lock IV, , , Continuous **AND** sodium chloride flush 0.9 % injection 3 mL, 3 mL, IntraVENous, Q8H, Jesus Manzanares MD    sodium chloride flush 0.9 % injection 5-40 mL, 5-40 mL, IntraVENous, 2 times per day, Stacey Fontan, APRN - CNP    sodium chloride flush 0.9 % injection 10 mL, 10 mL, IntraVENous, PRN, Stacey Fontan, APRN - CNP    0.9 % sodium chloride infusion, , IntraVENous, PRN, Stacey Fontan, APRN - CNP    ondansetron (ZOFRAN-ODT) disintegrating tablet 4 mg, 4 mg, Oral, Q8H PRN **OR** ondansetron (ZOFRAN) injection 4 mg, 4 mg, IntraVENous, Q6H PRN, Stacey Fontan, APRN - CNP, 4 mg at 02/07/23 0640    acetaminophen (TYLENOL) tablet 650 mg, 650 mg, Oral, Q6H PRN **OR** acetaminophen (TYLENOL) suppository 650 mg, 650 mg, Rectal, Q6H PRN, Stacey Fontan, APRN - CNP    0.9 % sodium chloride infusion, , IntraVENous, Continuous, Stacey Fontan, APRN - CNP, Last Rate: 75 mL/hr at 02/07/23 0519, New Bag at 02/07/23 0519    HYDROmorphone (DILAUDID) injection 0.25 mg, 0.25 mg, IntraVENous, Q4H PRN, Chaitanya Olmstead MD, 0.25 mg at 02/07/23 6528    hydrALAZINE (APRESOLINE) injection 5 mg, 5 mg, IntraVENous, Q6H PRN, Chaitanya Olmstead MD, 5 mg at 02/07/23 6086    phenol 1.4 % mouth spray 1 spray, 1 spray, Mouth/Throat, Q2H PRN, Chaitanya Olmstead MD    Vital Signs:  Vitals:    02/07/23 0745   BP: (!) 165/88   Pulse: 66   Resp: 18   Temp: 98.6 °F (37 °C)   SpO2: 96%       Physical Exam:  Vital signs and Nurse's note reviewed    General Appearance:  awake, alert, no acute distress, well developed, well nourished   Skin:  Skin color, texture, turgor normal. No rashes or lesions. Head/face:  NCAT, face symmetrical  Eyes:  PERRL, no evidence of conjunctivitis or ptosis bilaterally  Ears:  External ears and canals grossly normal, no evidence of otorrhea. Nose/Sinuses:  Nares normal. Septum midline. Mucosa normal. No external drainage noted. Mouth/Neck:  Mucosa moist.  No external oral lesions. Trachea midline. No visible masses.    Lungs:  Normal chest expansion, unlabored breathing without accessory muscle use. No audible rales, rhonchi, or wheezing. Cardiovascular: S1S2. No evidence of JVD. No evidence of pulsatile masses in abdomen  Abdomen:  Soft, non-tender, no organomegaly, no masses. Well healed midline laparotomy scar. Colostomy pink/patent. Musculoskeletal: No evidence of bony/muscular deformities, trauma, atrophy of either left/right upper/lower extremity. No evidence of digital clubbing or cyanosis. Neurologic:  CN 2-12 grossly intact without obvious deficits. Grossly normal sensation in all extremities. Psychiatric: appropriate judgement and insight, appropriate recall of recent and remote memory, no evidence of depression/anxiety/agitation    Labs:   CBC:   Recent Labs     02/07/23 0115   WBC 11.4*   HGB 13.7        BMP:    Recent Labs     02/07/23 0115      K 4.5      CO2 22   BUN 21   CREATININE 0.96   GLUCOSE 129*     Hepatic:   Recent Labs     02/07/23 0115   AST 9   ALT 6   ALKPHOS 64   BILITOT 0.6   LIPASE 15     Coagulation:   Recent Labs     02/07/23 0115   PROT 6.9         Imaging:  CT ABDOMEN PELVIS WO CONTRAST Additional Contrast? None    Result Date: 2/7/2023  EXAMINATION: CT OF THE ABDOMEN AND PELVIS WITHOUT CONTRAST 2/7/2023 12:14 am TECHNIQUE: CT of the abdomen and pelvis was performed without the administration of intravenous contrast. Multiplanar reformatted images are provided for review. Automated exposure control, iterative reconstruction, and/or weight based adjustment of the mA/kV was utilized to reduce the radiation dose to as low as reasonably achievable. COMPARISON: None.  HISTORY: ORDERING SYSTEM PROVIDED HISTORY: Abdominal pain and distension TECHNOLOGIST PROVIDED HISTORY: Abdominal pain and distension Decision Support Exception - unselect if not a suspected or confirmed emergency medical condition->Emergency Medical Condition (MA) Reason for Exam: Abdominal pain and distension FINDINGS: Lower Chest: At the base of the lungs, there is no acute process. No pleural effusion. No hiatal hernia. No pneumoperitoneum. Organs: There are a few tiny calculi in the upper posterior dependent portion of gallbladder. No inflammatory process around gallbladder. Normal liver without focal abnormality. Spleen is of normal size with a few small calcified granulomas scattered. Pancreas is moderate to markedly atrophic without acute process and without focal abnormality. Bilateral adrenal glands are normal. In right kidney there is no evidence of stone, mass or hydronephrosis. In the left kidney there is no evidence of stone, mass or hydronephrosis. Bilateral ureters are of normal size, without evidence of stone or obstruction. Mildly distended bladder without any stone or focal abnormality. GI/Bowel: No diagnostic finding in the stomach. Small amount of gas scattered in multiple loops of small bowel with multiple small and moderate-sized fluid levels in small bowel. There is a left paraumbilical-supraumbilical hernia containing loop of small bowel. In this loop there is retained semi solid stool-like material.  This hernia measures 8 cm transversely, 7 cm craniocaudad and 5.4 cm AP. There is another right lower paraumbilical hernia containing loop of small bowel. This hernia measures maximum 4.7 cm in diameter. In this hernia there is also a loop of small bowel containing semi solid stool-like material. There is another small left inferior paraumbilical hernia containing loop of small bowel which contains gas and fluid levels. This hernia without significant narrow neck measures 4.4 cm in diameter. There is evidence of partial obstruction of the small bowel in the right and left paraumbilical hernias. Evidence of previous surgical resection of the appendix. There might have been previous surgical resection of cecum and right ileocolic anastomosis. The ascending colon and transverse colon are short.   Evidence of mild diverticulosis of transverse colon without diverticulitis. The transverse colon extends to the right left lower quadrant colostomy site. Evidence of previous surgical resection of the left side of transverse colon and descending colon and the sigmoid colon. There is residual small segment of rectum noted in lower posterior pelvis. Pelvis: There is no abnormal fluid collection or inflammatory process in the pelvis. Prostate is not enlarged. No pelvic pathologic lymphadenopathy. Peritoneum/Retroperitoneum: Abdominal aorta is of normal size with mild calcified plaque. No evidence of periaortic or mesenteric pathologic lymphadenopathy. No evidence of ascites. Bones/Soft Tissues: No evidence of inguinal or femoral hernia. Evidence of moderate multilevel degenerative disc disease in the lumbar spine. No definable focal abnormality or acute process in the bony structures of pelvis and bilateral hip joints. Cholelithiasis. There are few small calculi in gallbladder. There is no inflammatory process around gallbladder. Evidence of previous surgical resection of descending colon and sigmoid colon. There is left lower quadrant colostomy into which there is extension of the left side of transverse colon. Evidence of mild diverticulosis of the transverse colon without diverticulitis. Also findings suggestive of previous surgical resection of cecum and right ileocolic anastomosis. On the right side of the colostomy site in the left paraumbilical location there is moderately prominent hernia containing loops of small bowel. Within this hernia there is partial obstruction of small bowel. The loops of small bowel within the hernia appears to contain semi solid stool-like material with mild to moderate distention. There is another smaller right paraumbilical hernia containing loop of small bowel which also contain semi solid stool-like material.  At these hernias, there is partial small bowel obstruction.   Evidence of small bowel dilation with multiple fluid levels proximal to the hernias. No obstruction in colon through colostomy site. No diagnostic finding in liver, spleen, pancreas and kidneys. No obstructive uropathy. No localized abnormal fluid collection or inflammation or abscess in the abdomen or in the pelvis. Assessment:    Amilcar Reyes is a 80 y.o. male with     Fecalization of small bowel  Multiple ventral/incisional hernias with incarcerated SB content  Multiple comorbid conditions    Plan:    No surgical intervention planned at this time, monitor NGT outputs, re-evaluate in AM. We had a lengthy discussion regarding emergency laparotomy if obstruction progresses, pt is agreeable to surgery at that point. We discussed the possibility of future ventral/incisional hernia recurrence if laparotomy incision can even be closed given his hernia location/sizes, he expresses understanding of this. Ambulate, IS while in bed.  [facility]  2/7/2023

## 2023-02-07 NOTE — CARE COORDINATION
Case Management Assessment  Initial Evaluation    Date/Time of Evaluation: 2/7/2023 11:59 AM  Assessment Completed by: Surjit Rivera RN    If patient is discharged prior to next notation, then this note serves as note for discharge by case management. Patient Name: Dave Johnson                   YOB: 1940  Diagnosis: Small bowel obstruction ([facility] [P80.418]  SBO (small bowel obstruction) ([facility] [address] [S40.132]                   Date / Time: 2/6/2023 11:49 PM    Patient Admission Status: Inpatient   Readmission Risk (Low < 19, Mod (19-27), High > 27): Readmission Risk Score: 10.9    Current PCP: Ivelisse Flores MD  PCP verified by CM? Chart Reviewed: Yes      History Provided by: Patient  Patient Orientation: Alert and Oriented    Patient Cognition: Alert    Hospitalization in the last 30 days (Readmission):  No    If yes, Readmission Assessment in  Navigator will be completed. Advance Directives:      Code Status: Full Code   Patient's Primary Decision Maker is: Legal Next of Kin      Discharge Planning:    Patient lives with: Spouse/Significant Other Type of Home: House  Primary Care Giver: Self  Patient Support Systems include: Spouse/Significant Other, Children, Family Members   Current Financial resources: None  Current community resources:    Current services prior to admission: None            Current DME:              Type of Home Care services:  None    ADLS  Prior functional level: Independent in ADLs/IADLs  Current functional level: Independent in ADLs/IADLs    PT AM-PAC:   /24  OT AM-PAC:   /24    Family can provide assistance at DC: Yes  Would you like Case Management to discuss the discharge plan with any other family members/significant others, and if so, who?     Plans to Return to Present Housing: Yes  Other Identified Issues/Barriers to RETURNING to current housing:   Potential Assistance needed at discharge: N/A            Potential DME:    Patient expects to discharge to: House  Plan for transportation at discharge: Family    Financial    Payor: Tyrell Tang / Plan: Emilia Contreras PPO / Product Type: Medicare /     Does insurance require precert for SNF: Yes    Potential assistance Purchasing Medications:    Meds-to-Beds request:        [id number] [employer] - [address] [place] [address]  Phone: [phone] Fax: [phone], [address] [phone] [facility] [phone]  [address] [place] [address]  Phone: [phone] Fax: [phone]      Notes:    Factors facilitating achievement of predicted outcomes:     Barriers to discharge: Additional Case Management Notes: d/c home independent    The Plan for Transition of Care is related to the following treatment goals of Small bowel obstruction ([address] [K56.609]  SBO (small bowel obstruction) ([address] [Z20.702]    IF APPLICABLE: The Patient and/or patient representative Arina Romero and his family were provided with a choice of provider and agrees with the discharge plan. Freedom of choice list with basic dialogue that supports the patient's individualized plan of care/goals and shares the quality data associated with the providers was provided to: Patient   Patient Representative Name:       The Patient and/or Patient Representative Agree with the Discharge Plan?  Yes    Brianna Blank RN  Case Management Department  Ph: [phone] Fax: [phone]

## 2023-02-07 NOTE — H&P
[facility]  Office: [address], DO, Trell Bundy, DO, Pao Apa, DO, Albertosaloni  Blood, DO, Jose Meyer MD, Migel Lopez MD, Modesta Hicks MD, Mya Thomas MD,  Evette Orozco MD, Marylee Sauers, MD, Carmita Fierro DO, Ness Benton MD,  Rosy Tang MD, Sandeep Garcia MD, Oswaldo Mayo DO, Alisa Dickey MD, Joana Zavala MD, Eliazar Riedel, DO, Amber Panda MD, Aysha Wilson MD, Mesfin Ochoa MD, Misbah Benjamin MD, Kaur Rosa DO, Jamal Leroy MD, Fawad Mercado MD, Richmond Valle, CNP,  Yann Booker, CNP, Cary Onofre, CNP, Abbey Kim, CNP,  Yesenia Solorzano, [facility], Ansley Vickers, CNP, Chelsy Moralez, CNP, Jonas Machuca, CNP, Cynthia Johnson, CNP, Zion Barba, CNP, Terrence Jean, PAJosephC, Richrd Mcburney, CNS, Rosalia Osborne, CNP, Vita Lofton, CNP         [facility]   [address] [facility]    HISTORY AND PHYSICAL EXAMINATION            Date:   2/7/2023  Patient name:  Dwana Boas  Date of admission:  2/6/2023 11:49 PM  MRN:   [id number]  Account:  [de-identified]  YOB: 1940  PCP:    Ervin Wilson MD  Room:   [facility]-  Code Status:    Full Code    Chief Complaint:     Chief Complaint   Patient presents with    Abdominal Pain     PAtient has a Colostomy to lower left abd. States he had [place] food yesterday and today has not had stool in his colostomy today. History Obtained From:     patient    History of Present Illness:     Dwana Boas is an 31-year-old male with a past medical history of colon cancer (s/p colon resection and colostomy creation), hypertension and hyperlipidemia who presented to the emergency department on 2/7/2023 complaining of nausea/vomiting and intractable abdominal pain. In the ED, the patient was afebrile and nontoxic appearing.  CT scan of the abdomen and pelvis was done and was significant for a left paraumbilical hernia containing a partial small bowel obstruction. The patient is admitted to internal medicine for further management of small bowel obstruction. Past Medical History:     Past Medical History:   Diagnosis Date    Choroid melanoma of left eye ([address]     Hyperlipidemia     Hypertension     Pink eye 03/18/2014    current treatment        Past Surgical History:     Past Surgical History:   Procedure Laterality Date    COLOSTOMY      EYE SURGERY Left     for cancer removal    SPINE SURGERY          Medications Prior to Admission:     Prior to Admission medications    Medication Sig Start Date End Date Taking? Authorizing Provider   lisinopril (PRINIVIL;ZESTRIL) 10 MG tablet Take 10 mg by mouth daily   Yes Historical Provider, MD   ferrous sulfate (IRON 325) 325 (65 Fe) MG tablet Take 325 mg by mouth daily   Yes Historical Provider, MD   cyanocobalamin 1000 MCG/ML injection Inject 1,000 mcg into the muscle once PATIENT UNSURE OF DOSE - GET INJECTION ONCE A MONTH AT DR. Kayden Perez OFFICE   Yes Historical Provider, MD   pantoprazole (PROTONIX) 20 MG tablet Take 20 mg by mouth daily  Patient not taking: Reported on 2/7/2023    Historical Provider, MD   cloNIDine (CATAPRES) 0.3 MG tablet Take 1 tablet by mouth 2 times daily 12/18/17   Tammy Ch MD   tamsulosin (FLOMAX) 0.4 MG capsule Take 0.4 mg by mouth daily. Historical Provider, MD   finasteride (PROSCAR) 5 MG tablet Take 5 mg by mouth daily. Historical Provider, MD   CRANBERRY EXTRACT PO Take  by mouth. Patient not taking: Reported on 2/7/2023    Historical Provider, MD        Allergies:     Pcn [penicillins] and Iodine    Social History:     Tobacco:    reports that he has never smoked. He has never used smokeless tobacco.  Alcohol:      reports current alcohol use. Drug Use:  reports no history of drug use. Family History:     History reviewed. No pertinent family history. Review of Systems:     Positive and Negative as described in HPI.     CONSTITUTIONAL:  negative for fevers, chills, sweats, fatigue, weight loss  HEENT:  negative for vision, hearing changes, runny nose, throat pain  RESPIRATORY:  negative for shortness of breath, cough, congestion, wheezing  CARDIOVASCULAR:  negative for chest pain, palpitations  GASTROINTESTINAL:  Positive for abdominal distension, nausea and anorexia. GENITOURINARY:  negative for difficulty of urination, burning with urination, frequency   INTEGUMENT:  negative for rash, skin lesions, easy bruising   HEMATOLOGIC/LYMPHATIC:  negative for swelling/edema   ALLERGIC/IMMUNOLOGIC:  negative for urticaria , itching  ENDOCRINE:  negative increase in drinking, increase in urination, hot or cold intolerance  MUSCULOSKELETAL:  negative joint pains, muscle aches, swelling of joints  NEUROLOGICAL:  negative for headaches, dizziness, lightheadedness, numbness, pain, tingling extremities  BEHAVIOR/PSYCH:  negative for depression, anxiety    Physical Exam:   BP (!) 165/88   Pulse 66   Temp 98.6 °F (37 °C) (Oral)   Resp 18   Ht 5' 5\" (1.651 m)   Wt 214 lb 11.7 oz (97.4 kg)   SpO2 96%   BMI 35.73 kg/m²   Temp (24hrs), Av.8 °F (37.1 °C), Min:98.2 °F (36.8 °C), Max:99.6 °F (37.6 °C)    No results for input(s): POCGLU in the last 72 hours.     Intake/Output Summary (Last 24 hours) at 2023 1256  Last data filed at 2023 8405  Gross per 24 hour   Intake 0 ml   Output 226 ml   Net -226 ml       General Appearance:  alert, well appearing, and in no acute distress  Mental status: oriented to person, place, and time  Head:  normocephalic, atraumatic  Eye: no icterus, redness, pupils equal and reactive, extraocular eye movements intact, conjunctiva clear  Ear: normal external ear, no discharge, hearing intact  Nose:  no drainage noted  Mouth: mucous membranes moist  Neck: supple, no carotid bruits, thyroid not palpable  Lungs: Bilateral equal air entry, clear to ausculation, no wheezing, rales or rhonchi, normal effort  Cardiovascular: normal rate, regular rhythm, no murmur, gallop, rub  Abdomen: Colostomy in place. Distended with hypoactive bowel sounds.    Neurologic: There are no new focal motor or sensory deficits, normal muscle tone and bulk, no abnormal sensation, normal speech, cranial nerves II through XII grossly intact  Skin: No gross lesions, rashes, bruising or bleeding on exposed skin area  Extremities:  peripheral pulses palpable, no pedal edema or calf pain with palpation  Psych: normal affect     Investigations:      Laboratory Testing:  Recent Results (from the past 24 hour(s))   CBC with Diff    Collection Time: 02/07/23  1:15 AM   Result Value Ref Range    WBC 11.4 (H) 3.5 - 11.0 k/uL    RBC 4.44 (L) 4.5 - 5.9 m/uL    Hemoglobin 13.7 13.5 - 17.5 g/dL    Hematocrit 41.5 41 - 53 %    MCV 93.5 80 - 100 fL    MCH 30.9 26 - 34 pg    MCHC 33.0 31 - 37 g/dL    RDW 14.3 12.5 - 15.4 %    Platelets 626 676 - 059 k/uL    MPV 10.4 8.0 - 14.0 fL    Seg Neutrophils 85 (H) 36 - 66 %    Lymphocytes 6 (L) 24 - 44 %    Monocytes 8 2 - 11 %    Eosinophils % 1 1 - 4 %    Basophils 0 0 - 2 %    Segs Absolute 9.62 (H) 1.8 - 7.7 k/uL    Absolute Lymph # 0.73 (L) 1.0 - 4.8 k/uL    Absolute Mono # 0.86 0.1 - 1.2 k/uL    Absolute Eos # 0.14 0.0 - 0.4 k/uL    Basophils Absolute 0.03 0.0 - 0.2 k/uL   CMP    Collection Time: 02/07/23  1:15 AM   Result Value Ref Range    Glucose 129 (H) 70 - 99 mg/dL    BUN 21 8 - 23 mg/dL    Creatinine 0.96 0.70 - 1.20 mg/dL    Est, Glom Filt Rate >60 >60 mL/min/1.73m2    Calcium 9.0 8.6 - 10.4 mg/dL    Sodium 138 135 - 144 mmol/L    Potassium 4.5 3.7 - 5.3 mmol/L    Chloride 107 98 - 107 mmol/L    CO2 22 20 - 31 mmol/L    Anion Gap 9 9 - 17 mmol/L    Alkaline Phosphatase 64 40 - 129 U/L    ALT 6 5 - 41 U/L    AST 9 <40 U/L    Total Bilirubin 0.6 0.3 - 1.2 mg/dL    Total Protein 6.9 6.4 - 8.3 g/dL    Albumin 3.9 3.5 - 5.2 g/dL    Albumin/Globulin Ratio 1.3 1.0 - 2.5   Lipase    Collection Time: 02/07/23  1:15 AM   Result Value Ref Range Lipase 15 13 - 60 U/L   Lactic Acid (Select if patient is over 65 to rule out mesenteric ischemia)    Collection Time: 02/07/23  1:15 AM   Result Value Ref Range    Lactic Acid 1.3 0.5 - 2.2 mmol/L   Troponin    Collection Time: 02/07/23  1:15 AM   Result Value Ref Range    Troponin, High Sensitivity 22 0 - 22 ng/L   Urinalysis with Reflex to Culture    Collection Time: 02/07/23  3:41 AM    Specimen: Urine   Result Value Ref Range    Color, UA Yellow Yellow    Turbidity UA Clear Clear    Glucose, Ur NEGATIVE NEGATIVE    Bilirubin Urine NEGATIVE NEGATIVE    Ketones, Urine NEGATIVE NEGATIVE    Specific Gravity, UA 1.025 1.005 - 1.030    Urine Hgb TRACE (A) NEGATIVE    pH, UA 5.5 5.0 - 8.0    Protein, UA NEGATIVE NEGATIVE    Urobilinogen, Urine Normal Normal    Nitrite, Urine NEGATIVE NEGATIVE    Leukocyte Esterase, Urine NEGATIVE NEGATIVE   Microscopic Urinalysis    Collection Time: 02/07/23  3:41 AM   Result Value Ref Range    WBC, UA 2 TO 5 0 - 5 /HPF    RBC, UA 0 TO 2 0 - 2 /HPF    Epithelial Cells UA 0 TO 2 0 - 5 /HPF    Bacteria, UA FEW (A) None    Other Observations UA (A) NOT REQ. Utilizing a urinalysis as the only screening method to exclude a potential uropathogen can be unreliable in many patient populations. Rapid screening tests are less sensitive than culture and if UTI is a clinical possibility, culture should be considered despite a negative urinalysis. Imaging/Diagnostics:  CT ABDOMEN PELVIS WO CONTRAST Additional Contrast? None    Result Date: 2/7/2023  Cholelithiasis. There are few small calculi in gallbladder. There is no inflammatory process around gallbladder. Evidence of previous surgical resection of descending colon and sigmoid colon. There is left lower quadrant colostomy into which there is extension of the left side of transverse colon. Evidence of mild diverticulosis of the transverse colon without diverticulitis.  Also findings suggestive of previous surgical resection of cecum and right ileocolic anastomosis. On the right side of the colostomy site in the left paraumbilical location there is moderately prominent hernia containing loops of small bowel. Within this hernia there is partial obstruction of small bowel. The loops of small bowel within the hernia appears to contain semi solid stool-like material with mild to moderate distention. There is another smaller right paraumbilical hernia containing loop of small bowel which also contain semi solid stool-like material.  At these hernias, there is partial small bowel obstruction. Evidence of small bowel dilation with multiple fluid levels proximal to the hernias. No obstruction in colon through colostomy site. No diagnostic finding in liver, spleen, pancreas and kidneys. No obstructive uropathy. No localized abnormal fluid collection or inflammation or abscess in the abdomen or in the pelvis. Assessment :      Hospital Problems             Last Modified POA    * (Principal) SBO (small bowel obstruction) ([address] 2/7/2023 Yes    Anxiety 2/7/2023 Yes    History of colon cancer 2/7/2023 Yes    Hypertension 2/7/2023 Yes       Plan:     Patient status inpatient in the Med/Surg unit. Small bowel obstruction   -CT abdomen and pelvis showing left paraumbilical hernia with partial obstruction of the small bowel   -NG-tube in place   -Maintenance fluids at 75 mL/hr   -Consult general surgery; appreciate recommendations   -Diet NPO   -Daily CBC   -Daily BMP      Consultations:   IP CONSULT TO GENERAL SURGERY    Patient is admitted as inpatient status because of co-morbidities listed above, severity of signs and symptoms as outlined, requirement for current medical therapies and most importantly because of direct risk to patient if care not provided in a hospital setting. Expected length of stay > 48 hours.     Yani Sanders MD  2/7/2023  12:56 PM    Copy sent to Dr. Carrie Fermin MD

## 2023-02-08 VITALS
HEART RATE: 81 BPM | TEMPERATURE: 98.2 F | RESPIRATION RATE: 18 BRPM | WEIGHT: 213.85 LBS | BODY MASS INDEX: 35.63 KG/M2 | OXYGEN SATURATION: 95 % | HEIGHT: 65 IN | SYSTOLIC BLOOD PRESSURE: 188 MMHG | DIASTOLIC BLOOD PRESSURE: 102 MMHG

## 2023-02-08 LAB
ABSOLUTE EOS #: 0 K/UL (ref 0–0.4)
ABSOLUTE LYMPH #: 0.7 K/UL (ref 1–4.8)
ABSOLUTE MONO #: 1.1 K/UL (ref 0.1–1.2)
ANION GAP SERPL CALCULATED.3IONS-SCNC: 10 MMOL/L (ref 9–17)
BASOPHILS # BLD: 0 % (ref 0–2)
BASOPHILS ABSOLUTE: 0 K/UL (ref 0–0.2)
BUN SERPL-MCNC: 16 MG/DL (ref 8–23)
CALCIUM SERPL-MCNC: 8.8 MG/DL (ref 8.6–10.4)
CHLORIDE SERPL-SCNC: 109 MMOL/L (ref 98–107)
CO2 SERPL-SCNC: 21 MMOL/L (ref 20–31)
CREAT SERPL-MCNC: 0.94 MG/DL (ref 0.7–1.2)
EKG ATRIAL RATE: 66 BPM
EKG P AXIS: 45 DEGREES
EKG P-R INTERVAL: 218 MS
EKG Q-T INTERVAL: 434 MS
EKG QRS DURATION: 90 MS
EKG QTC CALCULATION (BAZETT): 454 MS
EKG R AXIS: -15 DEGREES
EKG T AXIS: 23 DEGREES
EKG VENTRICULAR RATE: 66 BPM
EOSINOPHILS RELATIVE PERCENT: 0 % (ref 1–4)
GFR SERPL CREATININE-BSD FRML MDRD: >60 ML/MIN/1.73M2
GLUCOSE SERPL-MCNC: 130 MG/DL (ref 70–99)
HCT VFR BLD AUTO: 42 % (ref 41–53)
HGB BLD-MCNC: 13.7 G/DL (ref 13.5–17.5)
LYMPHOCYTES # BLD: 6 % (ref 24–44)
MCH RBC QN AUTO: 31.1 PG (ref 26–34)
MCHC RBC AUTO-ENTMCNC: 32.6 G/DL (ref 31–37)
MCV RBC AUTO: 95.3 FL (ref 80–100)
MONOCYTES # BLD: 10 % (ref 2–11)
PDW BLD-RTO: 15.1 % (ref 12.5–15.4)
PLATELET # BLD AUTO: 251 K/UL (ref 140–450)
PMV BLD AUTO: 8.4 FL (ref 6–12)
POTASSIUM SERPL-SCNC: 3.9 MMOL/L (ref 3.7–5.3)
RBC # BLD: 4.41 M/UL (ref 4.5–5.9)
SEG NEUTROPHILS: 84 % (ref 36–66)
SEGMENTED NEUTROPHILS ABSOLUTE COUNT: 9.7 K/UL (ref 1.8–7.7)
SODIUM SERPL-SCNC: 140 MMOL/L (ref 135–144)
WBC # BLD AUTO: 11.6 K/UL (ref 3.5–11)

## 2023-02-08 PROCEDURE — 2500000003 HC RX 250 WO HCPCS: Performed by: NURSE PRACTITIONER

## 2023-02-08 PROCEDURE — 2700000000 HC OXYGEN THERAPY PER DAY

## 2023-02-08 PROCEDURE — 99233 SBSQ HOSP IP/OBS HIGH 50: CPT | Performed by: SURGERY

## 2023-02-08 PROCEDURE — 2580000003 HC RX 258: Performed by: NURSE PRACTITIONER

## 2023-02-08 PROCEDURE — 6370000000 HC RX 637 (ALT 250 FOR IP): Performed by: SURGERY

## 2023-02-08 PROCEDURE — 6370000000 HC RX 637 (ALT 250 FOR IP): Performed by: STUDENT IN AN ORGANIZED HEALTH CARE EDUCATION/TRAINING PROGRAM

## 2023-02-08 PROCEDURE — 93005 ELECTROCARDIOGRAM TRACING: CPT | Performed by: SPECIALIST

## 2023-02-08 PROCEDURE — 36415 COLL VENOUS BLD VENIPUNCTURE: CPT

## 2023-02-08 PROCEDURE — 2500000003 HC RX 250 WO HCPCS: Performed by: STUDENT IN AN ORGANIZED HEALTH CARE EDUCATION/TRAINING PROGRAM

## 2023-02-08 PROCEDURE — 85025 COMPLETE CBC W/AUTO DIFF WBC: CPT

## 2023-02-08 PROCEDURE — 93005 ELECTROCARDIOGRAM TRACING: CPT | Performed by: STUDENT IN AN ORGANIZED HEALTH CARE EDUCATION/TRAINING PROGRAM

## 2023-02-08 PROCEDURE — 99238 HOSP IP/OBS DSCHRG MGMT 30/<: CPT | Performed by: STUDENT IN AN ORGANIZED HEALTH CARE EDUCATION/TRAINING PROGRAM

## 2023-02-08 PROCEDURE — 80048 BASIC METABOLIC PNL TOTAL CA: CPT

## 2023-02-08 PROCEDURE — 6360000002 HC RX W HCPCS: Performed by: NURSE PRACTITIONER

## 2023-02-08 RX ORDER — LABETALOL HYDROCHLORIDE 5 MG/ML
.5-3 INJECTION, SOLUTION INTRAVENOUS CONTINUOUS
Status: CANCELLED | OUTPATIENT
Start: 2023-02-08

## 2023-02-08 RX ORDER — PROCHLORPERAZINE EDISYLATE 5 MG/ML
10 INJECTION INTRAMUSCULAR; INTRAVENOUS ONCE
Status: COMPLETED | OUTPATIENT
Start: 2023-02-08 | End: 2023-02-08

## 2023-02-08 RX ORDER — TAMSULOSIN HYDROCHLORIDE 0.4 MG/1
0.4 CAPSULE ORAL DAILY
Status: DISCONTINUED | OUTPATIENT
Start: 2023-02-08 | End: 2023-02-08 | Stop reason: HOSPADM

## 2023-02-08 RX ORDER — HYDRALAZINE HYDROCHLORIDE 20 MG/ML
10 INJECTION INTRAMUSCULAR; INTRAVENOUS EVERY 6 HOURS PRN
Status: DISCONTINUED | OUTPATIENT
Start: 2023-02-08 | End: 2023-02-08 | Stop reason: HOSPADM

## 2023-02-08 RX ORDER — LISINOPRIL 10 MG/1
10 TABLET ORAL DAILY
Status: DISCONTINUED | OUTPATIENT
Start: 2023-02-08 | End: 2023-02-08 | Stop reason: HOSPADM

## 2023-02-08 RX ORDER — NICARDIPINE HYDROCHLORIDE 0.1 MG/ML
5 INJECTION INTRAVENOUS CONTINUOUS
Status: DISCONTINUED | OUTPATIENT
Start: 2023-02-08 | End: 2023-02-08

## 2023-02-08 RX ORDER — LABETALOL HYDROCHLORIDE 5 MG/ML
2 INJECTION, SOLUTION INTRAVENOUS CONTINUOUS
Status: DISCONTINUED | OUTPATIENT
Start: 2023-02-08 | End: 2023-02-08

## 2023-02-08 RX ORDER — FINASTERIDE 5 MG/1
5 TABLET, FILM COATED ORAL DAILY
Status: DISCONTINUED | OUTPATIENT
Start: 2023-02-08 | End: 2023-02-08 | Stop reason: HOSPADM

## 2023-02-08 RX ORDER — POLYETHYLENE GLYCOL 3350 17 G/17G
17 POWDER, FOR SOLUTION ORAL DAILY
Status: DISCONTINUED | OUTPATIENT
Start: 2023-02-08 | End: 2023-02-08 | Stop reason: HOSPADM

## 2023-02-08 RX ORDER — CLONIDINE HYDROCHLORIDE 0.1 MG/1
0.3 TABLET ORAL 2 TIMES DAILY
Status: DISCONTINUED | OUTPATIENT
Start: 2023-02-08 | End: 2023-02-08 | Stop reason: HOSPADM

## 2023-02-08 RX ORDER — METOPROLOL TARTRATE 5 MG/5ML
2.5 INJECTION INTRAVENOUS EVERY 6 HOURS
Status: DISCONTINUED | OUTPATIENT
Start: 2023-02-08 | End: 2023-02-08

## 2023-02-08 RX ADMIN — POLYETHYLENE GLYCOL 3350 17 G: 17 POWDER, FOR SOLUTION ORAL at 08:45

## 2023-02-08 RX ADMIN — PROCHLORPERAZINE EDISYLATE 10 MG: 5 INJECTION INTRAMUSCULAR; INTRAVENOUS at 01:31

## 2023-02-08 RX ADMIN — NICARDIPINE HYDROCHLORIDE 5 MG/HR: 0.1 INJECTION INTRAVENOUS at 03:58

## 2023-02-08 RX ADMIN — CLONIDINE HYDROCHLORIDE 0.3 MG: 0.1 TABLET ORAL at 08:45

## 2023-02-08 RX ADMIN — FINASTERIDE 5 MG: 5 TABLET, FILM COATED ORAL at 08:45

## 2023-02-08 RX ADMIN — LISINOPRIL 10 MG: 10 TABLET ORAL at 08:45

## 2023-02-08 RX ADMIN — DILTIAZEM HYDROCHLORIDE 5 MG/HR: 5 INJECTION, SOLUTION INTRAVENOUS at 03:11

## 2023-02-08 RX ADMIN — METOPROLOL TARTRATE 2.5 MG: 5 INJECTION, SOLUTION INTRAVENOUS at 06:10

## 2023-02-08 RX ADMIN — TAMSULOSIN HYDROCHLORIDE 0.4 MG: 0.4 CAPSULE ORAL at 08:45

## 2023-02-08 RX ADMIN — LABETALOL HYDROCHLORIDE 10 MG: 5 INJECTION, SOLUTION INTRAVENOUS at 00:31

## 2023-02-08 NOTE — PROGRESS NOTES
Patient transferred to room 337 to ICU status dureto HTN. Report received and Caredizem drip started. NP updated on patient condition and medication changed to cookie drip.

## 2023-02-08 NOTE — PROGRESS NOTES
NG placed 16 fr in left nare @ 50cm, air bolus heard for placement Chest xray ordered for confirmation.

## 2023-02-08 NOTE — PROGRESS NOTES
Notified Dr Isai Alston of patient's progress. Tolerating clear liquids. Orders received to dc ngt and advance diet. NGT removed wo difficulty. Patient tolerated well.

## 2023-02-08 NOTE — PROGRESS NOTES
Pt noted to be dropping O2 sats while sleeping. Pt placed on 2lpm nasal cannula. Continuous pulse ox on. Will monitor.

## 2023-02-08 NOTE — PROGRESS NOTES
Patient awake, sitting in chair. Wants to get up and walk. Verified with Dr. Pavan Pal there would be no adverse outcomes with this requested activity. Patient placed on portable monitor and he was able to independently walk the unit without difficulty. Clear liquid diet tray delivered to patient and he was able to take wo verbalization of nausea. Independently emptied ostomy bag for semi liquid to liquid brown stool   denies abdominal pain at this time  Discussed plan of care. Patient wants to go home today.   Oral medications taken without difficulty

## 2023-02-08 NOTE — PROGRESS NOTES
[facility] General Surgery Progress Note            PATIENT NAME: Rhoda Matthews     TODAY'S DATE: 2023, 7:23 AM    CC: constipation    SUBJECTIVE:    Pt seen and examined. No acute events overnight. Reports generous stool output from colostomy since yesterday. Denies any other complaints. OBJECTIVE:   Vitals:  BP (!) 165/95   Pulse 93   Temp 98.2 °F (36.8 °C) (Axillary)   Resp 12   Ht 5' 5\" (1.651 m)   Wt 213 lb 13.5 oz (97 kg)   SpO2 94%   BMI 35.59 kg/m²    TEMPERATURE:  Current - Temp: 98.2 °F (36.8 °C); Max - Temp  Av.8 °F (37.1 °C)  Min: 98.2 °F (36.8 °C)  Max: 99.3 °F (37.4 °C)    INTAKE/OUTPUT:      Intake/Output Summary (Last 24 hours) at 2023 0723  Last data filed at 2023 0030  Gross per 24 hour   Intake 10 ml   Output 1700 ml   Net -1690 ml                 General: AOx3, NAD  Lungs: Symmetrical chest rise bilaterally, no audible wheezes or rhonchi  Heart: S1S2  Abdomen: Soft, ND, Nontender. Extremity: moves all extremities x4, No edema      Data Review:  CBC:   Recent Labs     23  0615   WBC 11.4* 11.6*   HGB 13.7 13.7    251     BMP:    Recent Labs     23  0615    140   K 4.5 3.9    109*   CO2 22 21   BUN 21 16   CREATININE 0.96 0.94   GLUCOSE 129* 130*     Hepatic:   Recent Labs     23   AST 9   ALT 6   ALKPHOS 64   BILITOT 0.6     Coagulation:   Recent Labs     23   PROT 6.9             ASSESSMENT     Patient Active Problem List   Diagnosis    Hypertension    Ocular nevus    Ganglion cyst    Pain of right hand    SBO (small bowel obstruction) (HCC)    Anxiety    History of colon cancer    Small bowel obstruction (HCC)         PLAN    Clamp NGT, trial CLD this AM. If tolerating then okay to DC NGT and advance diet as tolerated  Recommend continue Miralax on discharge.  Can increase/decrease dose to maintain soft stools and flow of stool through incarcerated small bowel content, continued bowel management per primary  On discharge, recommend referral to [facility] ([facility], [address], [facility]) regarding evaluation for colostomy reversal and hernia repair  Available as needed    Electronically signed by Tamir Newell DO on 2/8/2023 at 7:25 AM

## 2023-02-08 NOTE — PLAN OF CARE
Problem: Discharge Planning  Goal: Discharge to home or other facility with appropriate resources  2/8/2023 0247 by Ray Mckeon RN  Outcome: Progressing   Patient actively participates in ADLs and decision making regarding plan of care. Problem: Pain  Goal: Verbalizes/displays adequate comfort level or baseline comfort level  2/8/2023 0247 by Ray Mckeon RN  Outcome: Progressing   No new signs/symptoms of pain noted, pain rating < 3 on scale 0-10, pain controlled with medication/repositioning. Problem: Safety - Adult  Goal: Free from fall injury  2/8/2023 0247 by Ray Mckeon RN  Outcome: Progressing   No falls/injuries this shift, bed in lowest position, brakes on, bed alarm on, call light in reach, side rails up x2.

## 2023-02-08 NOTE — PROGRESS NOTES
Updated intermed NP regarding pt bp. It is 180/120 despite giving lebetolol and hydralazine. New orders received.

## 2023-02-08 NOTE — DISCHARGE SUMMARY
[facility]  Office: [address], DO, Kate Solid, DO, Ewelina Shall, DO, Zainab Griffinelin Blood, DO, Ignacio Fitzgerald MD, Cristina Conrad MD, Chino Pérez MD, Eugene Smith MD,  Peterson Clifton MD, Mica Eden MD, Tori Guerin, DO, Cristhian Coyne MD,  Silver Bishop, DO, Eddie Berg MD, Hyun Flor MD, Ariel Bourgeois DO, Lia Valenzuela MD, Tonie Luo MD, Aurelia Yan DO, Torres Allen MD, Essie Martin MD, Leonora Velasco MD, Bisi Boykin MD, Anais Bashir DO, Josep Spivey MD, Alley Gerardo MD, Estefanía Graham, CNP,  Bethanie Maldonado, CNP, Jackie Chin, CNP, Cristiana Lowe, CNP,  Juan Daniels, [facility], Regla Pina, CNP, Bessy Levin, CNP, Tanja Garcia, CNP, Mahesh Pavon, CNP, Jorge Sears, CNP, Richelle Dwyer PA-C, Poornima Baum, CNS, Anthony Pozo, CNP, Christel Anderson, CNP         [facility]    Discharge Summary     Patient ID: Azalia Brennan  :  1940   MRN: [id number]     ACCOUNT:  [de-identified]   Patient's PCP: Pieter Santizo MD  Admit Date: 2023   Discharge Date: 2023     Length of Stay: 1  Code Status:  Full Code  Admitting Physician: Hyun Flor MD  Discharge Physician: Hyun Flor MD     Active Discharge Diagnoses:     Hospital Problem Lists:  Principal Problem:    SBO (small bowel obstruction) [facility])  Active Problems:    Anxiety    History of colon cancer    Small bowel obstruction [facility])    Hypertension  Resolved Problems:    * No resolved hospital problems. *      Admission Condition:  poor     Discharged Condition: good    Hospital Stay:     Hospital Course:  Azalia Brennan is an 71-year-old male with a past medical history of colon cancer (s/p colon resection and colostomy creation), hypertension and hyperlipidemia who presented to the emergency department on 2023 complaining of nausea/vomiting and intractable abdominal pain.  In the ED, the patient was afebrile and nontoxic appearing. CT scan of the abdomen and pelvis was done and was significant for a left paraumbilical hernia containing a partial small bowel obstruction. The patient was admitted to internal medicine for further management of small bowel obstruction. NG-tube was placed on admission and the patient's SBO spontaneously resolved less than 24-hours after placement. The patient's abdominal pain resolved during admission and copious stool was noted in his ostomy bag. The patient is discharged home today (2/8) in stable condition. He is instructed to follow-up with general surgery to discuss elective umbilical hernia repair as scheduled. Significant therapeutic interventions: NG-tube placement; bowel rest; general surgery consultation     Significant Diagnostic Studies:   Labs / Micro:  BMP:    Lab Results   Component Value Date/Time    GLUCOSE 130 02/08/2023 06:15 AM    GLUCOSE 92 03/01/2012 09:35 AM     02/08/2023 06:15 AM    K 3.9 02/08/2023 06:15 AM     02/08/2023 06:15 AM    CO2 21 02/08/2023 06:15 AM    ANIONGAP 10 02/08/2023 06:15 AM    BUN 16 02/08/2023 06:15 AM    CREATININE 0.94 02/08/2023 06:15 AM    BUNCRER NOT REPORTED 03/03/2017 09:00 AM    CALCIUM 8.8 02/08/2023 06:15 AM    LABGLOM >60 02/08/2023 06:15 AM    GFRAA >60 03/03/2017 09:00 AM    GFR      03/03/2017 09:00 AM    GFR NOT REPORTED 03/03/2017 09:00 AM     Radiology:  CT ABDOMEN PELVIS WO CONTRAST Additional Contrast? None    Result Date: 2/7/2023  Cholelithiasis. There are few small calculi in gallbladder. There is no inflammatory process around gallbladder. Evidence of previous surgical resection of descending colon and sigmoid colon. There is left lower quadrant colostomy into which there is extension of the left side of transverse colon. Evidence of mild diverticulosis of the transverse colon without diverticulitis. Also findings suggestive of previous surgical resection of cecum and right ileocolic anastomosis.  On the right side of the colostomy site in the left paraumbilical location there is moderately prominent hernia containing loops of small bowel. Within this hernia there is partial obstruction of small bowel. The loops of small bowel within the hernia appears to contain semi solid stool-like material with mild to moderate distention. There is another smaller right paraumbilical hernia containing loop of small bowel which also contain semi solid stool-like material.  At these hernias, there is partial small bowel obstruction. Evidence of small bowel dilation with multiple fluid levels proximal to the hernias. No obstruction in colon through colostomy site. No diagnostic finding in liver, spleen, pancreas and kidneys. No obstructive uropathy. No localized abnormal fluid collection or inflammation or abscess in the abdomen or in the pelvis. Consultations:    Consults:     Final Specialist Recommendations/Findings:   IP CONSULT TO GENERAL SURGERY      The patient was seen and examined on day of discharge and this discharge summary is in conjunction with any daily progress note from day of discharge. Discharge plan:     Disposition: Home    Physician Follow Up:     Sonia Raymundo MD  [place] [id number]  [facility] [address]  [address]  [phone]    Follow up      [address] [facility] [address] [phone]    Schedule an appointment as soon as possible for a visit in 2 week(s)  Umbilical hernia repair       Requiring Further Evaluation/Follow Up POST HOSPITALIZATION/Incidental Findings: Patient will need to follow-up with general surgery to discuss umbilical hernia repair. Diet: regular diet    Activity: As tolerated    Instructions to Patient: Follow-up with general surgery as scheduled.      Discharge Medications:      Medication List        START taking these medications      pantoprazole 20 MG tablet  Commonly known as: PROTONIX            CONTINUE taking these medications      cloNIDine 0.3 MG tablet  Commonly known as: CATAPRES  Take 1 tablet by mouth 2 times daily     cyanocobalamin 1000 MCG/ML injection     ferrous sulfate 325 (65 Fe) MG tablet  Commonly known as: IRON 325     finasteride 5 MG tablet  Commonly known as: PROSCAR     lisinopril 10 MG tablet  Commonly known as: PRINIVIL;ZESTRIL     tamsulosin 0.4 MG capsule  Commonly known as: FLOMAX            STOP taking these medications      CRANBERRY EXTRACT PO              No discharge procedures on file. Time Spent on discharge is  20 mins in patient examination, evaluation, counseling as well as medication reconciliation, prescriptions for required medications, discharge plan and follow up. Electronically signed by   Teddy Saini MD  2/8/2023  11:59 AM      Thank you Dr. Refugio Guerrero MD for the opportunity to be involved in this patient's care.

## 2023-02-08 NOTE — PROGRESS NOTES
Contacted [facility] NP regarding pt's bp. It is 183/120 and the HR is 102. Gave the ordered PRN hydralazine. No new orders received. @[phone] contacted [facility] NP regarding pt's bp. It is 185/118. New orders received.

## 2023-02-10 LAB
EKG ATRIAL RATE: 104 BPM
EKG ATRIAL RATE: 88 BPM
EKG P AXIS: 41 DEGREES
EKG P AXIS: 56 DEGREES
EKG P-R INTERVAL: 178 MS
EKG P-R INTERVAL: 182 MS
EKG Q-T INTERVAL: 350 MS
EKG Q-T INTERVAL: 380 MS
EKG QRS DURATION: 78 MS
EKG QRS DURATION: 84 MS
EKG QTC CALCULATION (BAZETT): 459 MS
EKG QTC CALCULATION (BAZETT): 460 MS
EKG R AXIS: -15 DEGREES
EKG R AXIS: -23 DEGREES
EKG T AXIS: 21 DEGREES
EKG T AXIS: 55 DEGREES
EKG VENTRICULAR RATE: 104 BPM
EKG VENTRICULAR RATE: 88 BPM

## 2023-02-21 ENCOUNTER — OFFICE VISIT (OUTPATIENT)
Dept: SURGERY | Age: 83
End: 2023-02-21
Payer: MEDICARE

## 2023-02-21 VITALS
SYSTOLIC BLOOD PRESSURE: 133 MMHG | WEIGHT: 213 LBS | RESPIRATION RATE: 16 BRPM | HEIGHT: 65 IN | BODY MASS INDEX: 35.49 KG/M2 | OXYGEN SATURATION: 100 % | HEART RATE: 71 BPM | DIASTOLIC BLOOD PRESSURE: 79 MMHG

## 2023-02-21 DIAGNOSIS — K43.9 VENTRAL HERNIA WITHOUT OBSTRUCTION OR GANGRENE: ICD-10-CM

## 2023-02-21 DIAGNOSIS — K56.609 SBO (SMALL BOWEL OBSTRUCTION) (HCC): Primary | ICD-10-CM

## 2023-02-21 DIAGNOSIS — K43.5 PARASTOMAL HERNIA WITHOUT OBSTRUCTION OR GANGRENE: ICD-10-CM

## 2023-02-21 PROCEDURE — 3078F DIAST BP <80 MM HG: CPT | Performed by: SURGERY

## 2023-02-21 PROCEDURE — 99203 OFFICE O/P NEW LOW 30 MIN: CPT | Performed by: SURGERY

## 2023-02-21 PROCEDURE — 3075F SYST BP GE 130 - 139MM HG: CPT | Performed by: SURGERY

## 2023-02-21 PROCEDURE — 1123F ACP DISCUSS/DSCN MKR DOCD: CPT | Performed by: SURGERY

## 2023-02-21 RX ORDER — POLYETHYLENE GLYCOL 3350 17 G/17G
17 POWDER, FOR SOLUTION ORAL 2 TIMES DAILY
Qty: 1530 G | Refills: 11 | Status: SHIPPED | OUTPATIENT
Start: 2023-02-21

## 2023-02-21 NOTE — PROGRESS NOTES
[address] [facility] Surgery   Clinic Evaluation  Maddi Wyatt DO    PATIENT NAME: [address] VISIT DATE: 2/21/2023    SUBJECTIVE:  Darius Funes is a 80 y.o. male who presents to the clinic today for follow up to hospital admission for SBO that was treated nonoperatively, his daughter is also present. No new issues since discharge. Pt is tolerating regular diet and has regular ostomy output. OBJECTIVE:    /79   Pulse 71   Resp 16   Ht 5' 5\" (1.651 m)   Wt 213 lb (96.6 kg)   SpO2 100%   BMI 35.45 kg/m²     General Appearance:  awake, alert, no acute distress, well developed, well nourished   Skin:  Skin color, texture, turgor normal. No rashes or lesions. Lungs:  Normal chest expansion, unlabored breathing without accessory muscle use. No audible rales, rhonchi, or wheezing. Cardiovascular: S1S2. No evidence of JVD. No evidence of pulsatile masses in abdomen  Abdomen:  Soft, non-tender, no organomegaly, no masses. Musculoskeletal: No evidence of bony/muscular deformities, trauma, atrophy of either left/right upper/lower extremity. No evidence of digital clubbing or cyanosis. ASSESSMENT:  1. SBO (small bowel obstruction) ([address]    2. Ventral hernia without obstruction or gangrene    3. Parastomal hernia without obstruction or gangrene                PLAN:  We discussed treatment plan moving forward. Pt is not interested in further abdominal surgery at this time. We did discuss referral to a larger center if pt's complex abdominal wall becomes more symptomatic and/or he chooses to pursue colostomy reversal.   At this point I would recommend regular Miralax to maintain stool consistency, there is a potential for slowing intestinal transit given the presence of multiple abdominal wall hernias containing loops of small bowel as well as likely intraabdominal adhesions.  We discussed the dosing for this, he can increase/decrease the dose and frequency in order to maintain regularity. We discussed activity, at this point pt is able to perform all ADLs, I have recommended against any strenuous exercise/activity to avoid risk of hernia exacerbation. Pt can otherwise follow up with me as needed. All questions were answered, pt is agreeable to this plan.       Electronically signed by Esme Blcok DO on 2/21/2023 at 11:48 AM

## 2023-02-21 NOTE — LETTER
[facility]  Surgical Specialties - General Surgery  [address]  [place], [address]  Phone: [phone]  Fax: [phone]      23    Patient: Sebastian Tapia  MRN: [id number]  : 1940  Date of visit: 2023    Dear Bobby Pham MD:      I saw Sebastian Tapia in post admission follow up. Below are the relevant portions of my assessment and plan of care. If you have questions, please do not hesitate to call me. I look forward to following Sebastian Tapia along with you. Sincerely,        Marixa Sen DO      [address] [facility] Surgery   Clinic Evaluation  Marixa DO Francy    PATIENT NAME: [address] [place] [address] VISIT DATE: 2023    SUBJECTIVE:  Sebastian Tapia is a 80 y.o. male who presents to the clinic today for follow up to hospital admission for SBO that was treated nonoperatively, his daughter is also present. No new issues since discharge. Pt is tolerating regular diet and has regular ostomy output. OBJECTIVE:    /79   Pulse 71   Resp 16   Ht 5' 5\" (1.651 m)   Wt 213 lb (96.6 kg)   SpO2 100%   BMI 35.45 kg/m²     General Appearance:  awake, alert, no acute distress, well developed, well nourished   Skin:  Skin color, texture, turgor normal. No rashes or lesions. Lungs:  Normal chest expansion, unlabored breathing without accessory muscle use. No audible rales, rhonchi, or wheezing. Cardiovascular: S1S2. No evidence of JVD. No evidence of pulsatile masses in abdomen  Abdomen:  Soft, non-tender, no organomegaly, no masses. Musculoskeletal: No evidence of bony/muscular deformities, trauma, atrophy of either left/right upper/lower extremity. No evidence of digital clubbing or cyanosis. ASSESSMENT:  1. SBO (small bowel obstruction) ([facility] [address]    2. Ventral hernia without obstruction or gangrene    3. Parastomal hernia without obstruction or gangrene                PLAN:  1.  We discussed treatment plan moving forward. Pt is not interested in further abdominal surgery at this time. We did discuss referral to a larger center if pt's complex abdominal wall becomes more symptomatic and/or he chooses to pursue colostomy reversal.   2. At this point I would recommend regular Miralax to maintain stool consistency, there is a potential for slowing intestinal transit given the presence of multiple abdominal wall hernias containing loops of small bowel as well as likely intraabdominal adhesions. We discussed the dosing for this, he can increase/decrease the dose and frequency in order to maintain regularity. 3. We discussed activity, at this point pt is able to perform all ADLs, I have recommended against any strenuous exercise/activity to avoid risk of hernia exacerbation. 4. Pt can otherwise follow up with me as needed. All questions were answered, pt is agreeable to this plan.   5.     Electronically signed by Radha Zaragoza DO on 2/21/2023 at 11:48 AM

## 2024-05-07 ENCOUNTER — HOSPITAL ENCOUNTER (INPATIENT)
Age: 84
LOS: 1 days | Discharge: HOME | DRG: 395 | End: 2024-05-08
Attending: EMERGENCY MEDICINE | Admitting: SURGERY
Payer: MEDICARE

## 2024-05-07 ENCOUNTER — APPOINTMENT (OUTPATIENT)
Dept: CT IMAGING | Age: 84
DRG: 395 | End: 2024-05-07
Payer: MEDICARE

## 2024-05-07 DIAGNOSIS — K46.9 HERNIA OF ABDOMINAL CAVITY: ICD-10-CM

## 2024-05-07 DIAGNOSIS — R10.30 LOWER ABDOMINAL PAIN: Primary | ICD-10-CM

## 2024-05-07 PROBLEM — K43.9 VENTRAL HERNIA WITHOUT OBSTRUCTION OR GANGRENE: Status: ACTIVE | Noted: 2024-05-07

## 2024-05-07 LAB
ALBUMIN SERPL-MCNC: 4.5 G/DL (ref 3.5–5.2)
ALBUMIN/GLOB SERPL: 1.3 {RATIO} (ref 1–2.5)
ALP SERPL-CCNC: 71 U/L (ref 40–129)
ALT SERPL-CCNC: 10 U/L (ref 5–41)
ANION GAP SERPL CALCULATED.3IONS-SCNC: 11 MMOL/L (ref 9–17)
AST SERPL-CCNC: 14 U/L
BASOPHILS # BLD: 0.1 K/UL (ref 0–0.2)
BASOPHILS NFR BLD: 1 % (ref 0–2)
BILIRUB SERPL-MCNC: 0.7 MG/DL (ref 0.3–1.2)
BUN SERPL-MCNC: 21 MG/DL (ref 8–23)
CALCIUM SERPL-MCNC: 9.8 MG/DL (ref 8.6–10.4)
CHLORIDE SERPL-SCNC: 107 MMOL/L (ref 98–107)
CO2 SERPL-SCNC: 24 MMOL/L (ref 20–31)
CREAT SERPL-MCNC: 1.4 MG/DL (ref 0.7–1.2)
EOSINOPHIL # BLD: 0.1 K/UL (ref 0–0.4)
EOSINOPHILS RELATIVE PERCENT: 1 % (ref 1–4)
ERYTHROCYTE [DISTWIDTH] IN BLOOD BY AUTOMATED COUNT: 14.3 % (ref 12.5–15.4)
GFR, ESTIMATED: 50 ML/MIN/1.73M2
GLUCOSE SERPL-MCNC: 116 MG/DL (ref 70–99)
HCT VFR BLD AUTO: 40.6 % (ref 41–53)
HGB BLD-MCNC: 13.6 G/DL (ref 13.5–17.5)
INR PPP: 0.9
LACTATE BLDV-SCNC: 1.3 MMOL/L (ref 0.5–1.9)
LIPASE SERPL-CCNC: 18 U/L (ref 13–60)
LYMPHOCYTES NFR BLD: 0.8 K/UL (ref 1–4.8)
LYMPHOCYTES RELATIVE PERCENT: 8 % (ref 24–44)
MAGNESIUM SERPL-MCNC: 2.1 MG/DL (ref 1.6–2.6)
MCH RBC QN AUTO: 32 PG (ref 26–34)
MCHC RBC AUTO-ENTMCNC: 33.5 G/DL (ref 31–37)
MCV RBC AUTO: 95.7 FL (ref 80–100)
MONOCYTES NFR BLD: 0.6 K/UL (ref 0.1–1.2)
MONOCYTES NFR BLD: 5 % (ref 2–11)
NEUTROPHILS NFR BLD: 85 % (ref 36–66)
NEUTS SEG NFR BLD: 9.4 K/UL (ref 1.8–7.7)
PARTIAL THROMBOPLASTIN TIME: 25.4 SEC (ref 21.3–31.3)
PLATELET # BLD AUTO: 289 K/UL (ref 140–450)
PMV BLD AUTO: 8.5 FL (ref 6–12)
POTASSIUM SERPL-SCNC: 4.5 MMOL/L (ref 3.7–5.3)
PROT SERPL-MCNC: 7.9 G/DL (ref 6.4–8.3)
PROTHROMBIN TIME: 10 SEC (ref 9.4–12.6)
RBC # BLD AUTO: 4.24 M/UL (ref 4.5–5.9)
SODIUM SERPL-SCNC: 142 MMOL/L (ref 135–144)
WBC OTHER # BLD: 11.1 K/UL (ref 3.5–11)

## 2024-05-07 PROCEDURE — 1200000000 HC SEMI PRIVATE

## 2024-05-07 PROCEDURE — 85025 COMPLETE CBC W/AUTO DIFF WBC: CPT

## 2024-05-07 PROCEDURE — 2580000003 HC RX 258: Performed by: EMERGENCY MEDICINE

## 2024-05-07 PROCEDURE — 6360000002 HC RX W HCPCS: Performed by: EMERGENCY MEDICINE

## 2024-05-07 PROCEDURE — 74176 CT ABD & PELVIS W/O CONTRAST: CPT

## 2024-05-07 PROCEDURE — 99222 1ST HOSP IP/OBS MODERATE 55: CPT

## 2024-05-07 PROCEDURE — 96374 THER/PROPH/DIAG INJ IV PUSH: CPT

## 2024-05-07 PROCEDURE — 99285 EMERGENCY DEPT VISIT HI MDM: CPT

## 2024-05-07 PROCEDURE — 83690 ASSAY OF LIPASE: CPT

## 2024-05-07 PROCEDURE — 36415 COLL VENOUS BLD VENIPUNCTURE: CPT

## 2024-05-07 PROCEDURE — 80053 COMPREHEN METABOLIC PANEL: CPT

## 2024-05-07 PROCEDURE — 85730 THROMBOPLASTIN TIME PARTIAL: CPT

## 2024-05-07 PROCEDURE — 83735 ASSAY OF MAGNESIUM: CPT

## 2024-05-07 PROCEDURE — 85610 PROTHROMBIN TIME: CPT

## 2024-05-07 PROCEDURE — 6370000000 HC RX 637 (ALT 250 FOR IP): Performed by: EMERGENCY MEDICINE

## 2024-05-07 PROCEDURE — 83605 ASSAY OF LACTIC ACID: CPT

## 2024-05-07 RX ORDER — FERROUS SULFATE 325(65) MG
325 TABLET ORAL DAILY
Status: CANCELLED | OUTPATIENT
Start: 2024-05-08

## 2024-05-07 RX ORDER — LISINOPRIL 10 MG/1
10 TABLET ORAL DAILY
Status: CANCELLED | OUTPATIENT
Start: 2024-05-08

## 2024-05-07 RX ORDER — 0.9 % SODIUM CHLORIDE 0.9 %
1000 INTRAVENOUS SOLUTION INTRAVENOUS ONCE
Status: COMPLETED | OUTPATIENT
Start: 2024-05-07 | End: 2024-05-07

## 2024-05-07 RX ORDER — ONDANSETRON 2 MG/ML
4 INJECTION INTRAMUSCULAR; INTRAVENOUS EVERY 6 HOURS PRN
Status: DISCONTINUED | OUTPATIENT
Start: 2024-05-07 | End: 2024-05-08 | Stop reason: HOSPADM

## 2024-05-07 RX ORDER — ONDANSETRON 4 MG/1
4 TABLET, ORALLY DISINTEGRATING ORAL EVERY 8 HOURS PRN
Status: DISCONTINUED | OUTPATIENT
Start: 2024-05-07 | End: 2024-05-08 | Stop reason: HOSPADM

## 2024-05-07 RX ORDER — MORPHINE SULFATE 2 MG/ML
2 INJECTION, SOLUTION INTRAMUSCULAR; INTRAVENOUS ONCE
Status: DISCONTINUED | OUTPATIENT
Start: 2024-05-07 | End: 2024-05-08 | Stop reason: HOSPADM

## 2024-05-07 RX ORDER — PANTOPRAZOLE SODIUM 20 MG/1
20 TABLET, DELAYED RELEASE ORAL DAILY
Status: CANCELLED | OUTPATIENT
Start: 2024-05-08

## 2024-05-07 RX ORDER — CLONIDINE HYDROCHLORIDE 0.1 MG/1
0.3 TABLET ORAL 2 TIMES DAILY
Status: CANCELLED | OUTPATIENT
Start: 2024-05-08

## 2024-05-07 RX ORDER — FINASTERIDE 5 MG/1
5 TABLET, FILM COATED ORAL DAILY
Status: CANCELLED | OUTPATIENT
Start: 2024-05-08

## 2024-05-07 RX ORDER — TAMSULOSIN HYDROCHLORIDE 0.4 MG/1
0.4 CAPSULE ORAL DAILY
Status: CANCELLED | OUTPATIENT
Start: 2024-05-08

## 2024-05-07 RX ORDER — 0.9 % SODIUM CHLORIDE 0.9 %
1000 INTRAVENOUS SOLUTION INTRAVENOUS ONCE
Status: DISCONTINUED | OUTPATIENT
Start: 2024-05-07 | End: 2024-05-08 | Stop reason: HOSPADM

## 2024-05-07 RX ORDER — HYDRALAZINE HYDROCHLORIDE 20 MG/ML
5 INJECTION INTRAMUSCULAR; INTRAVENOUS ONCE
Status: COMPLETED | OUTPATIENT
Start: 2024-05-07 | End: 2024-05-07

## 2024-05-07 RX ORDER — SODIUM CHLORIDE 9 MG/ML
INJECTION, SOLUTION INTRAVENOUS CONTINUOUS
Status: DISCONTINUED | OUTPATIENT
Start: 2024-05-07 | End: 2024-05-08 | Stop reason: HOSPADM

## 2024-05-07 RX ORDER — MORPHINE SULFATE 2 MG/ML
2 INJECTION, SOLUTION INTRAMUSCULAR; INTRAVENOUS EVERY 4 HOURS PRN
Status: DISCONTINUED | OUTPATIENT
Start: 2024-05-07 | End: 2024-05-08 | Stop reason: HOSPADM

## 2024-05-07 RX ORDER — CLONIDINE 0.2 MG/24H
1 PATCH, EXTENDED RELEASE TRANSDERMAL WEEKLY
Status: DISCONTINUED | OUTPATIENT
Start: 2024-05-07 | End: 2024-05-08 | Stop reason: HOSPADM

## 2024-05-07 RX ADMIN — SODIUM CHLORIDE 1000 ML: 9 INJECTION, SOLUTION INTRAVENOUS at 20:53

## 2024-05-07 RX ADMIN — HYDRALAZINE HYDROCHLORIDE 5 MG: 20 INJECTION, SOLUTION INTRAMUSCULAR; INTRAVENOUS at 22:30

## 2024-05-07 ASSESSMENT — ENCOUNTER SYMPTOMS
NAUSEA: 1
VOMITING: 0
SORE THROAT: 0
SHORTNESS OF BREATH: 0
DIARRHEA: 0
ABDOMINAL PAIN: 1

## 2024-05-07 ASSESSMENT — LIFESTYLE VARIABLES
HOW OFTEN DO YOU HAVE A DRINK CONTAINING ALCOHOL: MONTHLY OR LESS
HOW MANY STANDARD DRINKS CONTAINING ALCOHOL DO YOU HAVE ON A TYPICAL DAY: 1 OR 2

## 2024-05-07 ASSESSMENT — PAIN - FUNCTIONAL ASSESSMENT: PAIN_FUNCTIONAL_ASSESSMENT: 0-10

## 2024-05-07 ASSESSMENT — PAIN DESCRIPTION - LOCATION: LOCATION: ABDOMEN

## 2024-05-07 ASSESSMENT — PAIN SCALES - GENERAL
PAINLEVEL_OUTOF10: 3
PAINLEVEL_OUTOF10: 10

## 2024-05-08 VITALS
OXYGEN SATURATION: 95 % | TEMPERATURE: 97.9 F | RESPIRATION RATE: 15 BRPM | WEIGHT: 210 LBS | DIASTOLIC BLOOD PRESSURE: 82 MMHG | HEART RATE: 80 BPM | SYSTOLIC BLOOD PRESSURE: 165 MMHG | BODY MASS INDEX: 33.75 KG/M2 | HEIGHT: 66 IN

## 2024-05-08 PROBLEM — E78.5 HYPERLIPIDEMIA: Status: ACTIVE | Noted: 2024-05-08

## 2024-05-08 PROBLEM — I16.0 HYPERTENSIVE URGENCY: Status: ACTIVE | Noted: 2024-05-08

## 2024-05-08 PROBLEM — N40.0 BPH (BENIGN PROSTATIC HYPERPLASIA): Status: ACTIVE | Noted: 2024-05-08

## 2024-05-08 LAB
ALBUMIN SERPL-MCNC: 3.8 G/DL (ref 3.5–5.2)
ALBUMIN/GLOB SERPL: 1.2 {RATIO} (ref 1–2.5)
ALP SERPL-CCNC: 59 U/L (ref 40–129)
ALT SERPL-CCNC: <5 U/L (ref 5–41)
ANION GAP SERPL CALCULATED.3IONS-SCNC: 10 MMOL/L (ref 9–17)
AST SERPL-CCNC: 13 U/L
BILIRUB SERPL-MCNC: 0.7 MG/DL (ref 0.3–1.2)
BUN SERPL-MCNC: 15 MG/DL (ref 8–23)
CALCIUM SERPL-MCNC: 8.8 MG/DL (ref 8.6–10.4)
CHLORIDE SERPL-SCNC: 111 MMOL/L (ref 98–107)
CO2 SERPL-SCNC: 21 MMOL/L (ref 20–31)
CREAT SERPL-MCNC: 1 MG/DL (ref 0.7–1.2)
ERYTHROCYTE [DISTWIDTH] IN BLOOD BY AUTOMATED COUNT: 13.9 % (ref 12.5–15.4)
GFR, ESTIMATED: 74 ML/MIN/1.73M2
GLUCOSE SERPL-MCNC: 105 MG/DL (ref 70–99)
HCT VFR BLD AUTO: 36.8 % (ref 41–53)
HGB BLD-MCNC: 12.4 G/DL (ref 13.5–17.5)
LDH SERPL-CCNC: 192 U/L (ref 135–225)
MCH RBC QN AUTO: 32 PG (ref 26–34)
MCHC RBC AUTO-ENTMCNC: 33.7 G/DL (ref 31–37)
MCV RBC AUTO: 95.1 FL (ref 80–100)
PLATELET # BLD AUTO: 254 K/UL (ref 140–450)
PMV BLD AUTO: 9 FL (ref 6–12)
POTASSIUM SERPL-SCNC: 3.8 MMOL/L (ref 3.7–5.3)
PROT SERPL-MCNC: 6.9 G/DL (ref 6.4–8.3)
RBC # BLD AUTO: 3.87 M/UL (ref 4.5–5.9)
SODIUM SERPL-SCNC: 142 MMOL/L (ref 135–144)
WBC OTHER # BLD: 8.5 K/UL (ref 3.5–11)

## 2024-05-08 PROCEDURE — 6370000000 HC RX 637 (ALT 250 FOR IP)

## 2024-05-08 PROCEDURE — 2580000003 HC RX 258: Performed by: SURGERY

## 2024-05-08 PROCEDURE — 36415 COLL VENOUS BLD VENIPUNCTURE: CPT

## 2024-05-08 PROCEDURE — 6360000002 HC RX W HCPCS

## 2024-05-08 PROCEDURE — 6370000000 HC RX 637 (ALT 250 FOR IP): Performed by: NURSE PRACTITIONER

## 2024-05-08 PROCEDURE — 80053 COMPREHEN METABOLIC PANEL: CPT

## 2024-05-08 PROCEDURE — 99232 SBSQ HOSP IP/OBS MODERATE 35: CPT | Performed by: STUDENT IN AN ORGANIZED HEALTH CARE EDUCATION/TRAINING PROGRAM

## 2024-05-08 PROCEDURE — 83615 LACTATE (LD) (LDH) ENZYME: CPT

## 2024-05-08 PROCEDURE — 85027 COMPLETE CBC AUTOMATED: CPT

## 2024-05-08 RX ORDER — TAMSULOSIN HYDROCHLORIDE 0.4 MG/1
0.4 CAPSULE ORAL NIGHTLY
Status: DISCONTINUED | OUTPATIENT
Start: 2024-05-08 | End: 2024-05-08 | Stop reason: HOSPADM

## 2024-05-08 RX ORDER — PANTOPRAZOLE SODIUM 20 MG/1
20 TABLET, DELAYED RELEASE ORAL NIGHTLY
Status: DISCONTINUED | OUTPATIENT
Start: 2024-05-08 | End: 2024-05-08 | Stop reason: HOSPADM

## 2024-05-08 RX ORDER — TAMSULOSIN HYDROCHLORIDE 0.4 MG/1
0.4 CAPSULE ORAL DAILY
Status: DISCONTINUED | OUTPATIENT
Start: 2024-05-08 | End: 2024-05-08

## 2024-05-08 RX ORDER — UREA 10 %
325 LOTION (ML) TOPICAL DAILY
Status: DISCONTINUED | OUTPATIENT
Start: 2024-05-08 | End: 2024-05-08 | Stop reason: HOSPADM

## 2024-05-08 RX ORDER — ACETAMINOPHEN 325 MG/1
650 TABLET ORAL EVERY 4 HOURS PRN
Status: DISCONTINUED | OUTPATIENT
Start: 2024-05-08 | End: 2024-05-08 | Stop reason: HOSPADM

## 2024-05-08 RX ORDER — FINASTERIDE 5 MG/1
5 TABLET, FILM COATED ORAL DAILY
Status: DISCONTINUED | OUTPATIENT
Start: 2024-05-08 | End: 2024-05-08

## 2024-05-08 RX ORDER — LISINOPRIL 10 MG/1
10 TABLET ORAL DAILY
Status: DISCONTINUED | OUTPATIENT
Start: 2024-05-08 | End: 2024-05-08

## 2024-05-08 RX ORDER — ACETAMINOPHEN 325 MG/1
650 TABLET ORAL EVERY 4 HOURS PRN
Status: DISCONTINUED | OUTPATIENT
Start: 2024-05-08 | End: 2024-05-08 | Stop reason: SDUPTHER

## 2024-05-08 RX ORDER — PANTOPRAZOLE SODIUM 20 MG/1
20 TABLET, DELAYED RELEASE ORAL DAILY
Status: DISCONTINUED | OUTPATIENT
Start: 2024-05-08 | End: 2024-05-08

## 2024-05-08 RX ORDER — FINASTERIDE 5 MG/1
5 TABLET, FILM COATED ORAL NIGHTLY
Status: DISCONTINUED | OUTPATIENT
Start: 2024-05-08 | End: 2024-05-08 | Stop reason: HOSPADM

## 2024-05-08 RX ORDER — LISINOPRIL 10 MG/1
10 TABLET ORAL NIGHTLY
Status: DISCONTINUED | OUTPATIENT
Start: 2024-05-08 | End: 2024-05-08 | Stop reason: HOSPADM

## 2024-05-08 RX ORDER — HYDRALAZINE HYDROCHLORIDE 20 MG/ML
10 INJECTION INTRAMUSCULAR; INTRAVENOUS EVERY 6 HOURS PRN
Status: DISCONTINUED | OUTPATIENT
Start: 2024-05-08 | End: 2024-05-08 | Stop reason: HOSPADM

## 2024-05-08 RX ADMIN — LISINOPRIL 10 MG: 10 TABLET ORAL at 01:26

## 2024-05-08 RX ADMIN — HYDRALAZINE HYDROCHLORIDE 10 MG: 20 INJECTION INTRAMUSCULAR; INTRAVENOUS at 10:48

## 2024-05-08 RX ADMIN — FINASTERIDE 5 MG: 5 TABLET, FILM COATED ORAL at 01:26

## 2024-05-08 RX ADMIN — ACETAMINOPHEN 650 MG: 325 TABLET ORAL at 05:19

## 2024-05-08 RX ADMIN — SODIUM CHLORIDE: 9 INJECTION, SOLUTION INTRAVENOUS at 00:20

## 2024-05-08 RX ADMIN — TAMSULOSIN HYDROCHLORIDE 0.4 MG: 0.4 CAPSULE ORAL at 01:26

## 2024-05-08 RX ADMIN — FERROUS SULFATE TAB EC 325 MG (65 MG FE EQUIVALENT) 325 MG: 325 (65 FE) TABLET DELAYED RESPONSE at 08:25

## 2024-05-08 RX ADMIN — PANTOPRAZOLE SODIUM 20 MG: 20 TABLET, DELAYED RELEASE ORAL at 01:26

## 2024-05-08 RX ADMIN — ACETAMINOPHEN 650 MG: 325 TABLET ORAL at 12:04

## 2024-05-08 RX ADMIN — HYDRALAZINE HYDROCHLORIDE 10 MG: 20 INJECTION INTRAMUSCULAR; INTRAVENOUS at 04:59

## 2024-05-08 ASSESSMENT — PAIN DESCRIPTION - LOCATION
LOCATION: HEAD
LOCATION: HEAD

## 2024-05-08 ASSESSMENT — PAIN - FUNCTIONAL ASSESSMENT
PAIN_FUNCTIONAL_ASSESSMENT: ACTIVITIES ARE NOT PREVENTED
PAIN_FUNCTIONAL_ASSESSMENT: ACTIVITIES ARE NOT PREVENTED

## 2024-05-08 ASSESSMENT — PAIN SCALES - GENERAL
PAINLEVEL_OUTOF10: 4
PAINLEVEL_OUTOF10: 3
PAINLEVEL_OUTOF10: 0

## 2024-05-08 ASSESSMENT — PAIN DESCRIPTION - DESCRIPTORS: DESCRIPTORS: DISCOMFORT

## 2024-05-08 ASSESSMENT — ENCOUNTER SYMPTOMS
CHEST TIGHTNESS: 0
SHORTNESS OF BREATH: 0
ABDOMINAL PAIN: 1
VOMITING: 0
CONSTIPATION: 1
ABDOMINAL DISTENTION: 1
COUGH: 0
NAUSEA: 1

## 2024-05-08 ASSESSMENT — PAIN DESCRIPTION - ORIENTATION: ORIENTATION: INNER

## 2024-05-08 NOTE — DISCHARGE INSTR - DIET

## 2024-05-08 NOTE — CARE COORDINATION
none  Potential Assistance needed at discharge: Home Care            Potential DME:    Patient expects to discharge to: House  Plan for transportation at discharge: Family    Financial    Payor: [employer] MEDICARE / Plan: [employer] MEDICARE-ADVANTAGE PPO / Product Type: Medicare /     Does insurance require precert for SNF: Yes    Potential assistance Purchasing Medications: No  Meds-to-Beds request:        [facility] - [place], [place] - [address] [facility] [address] - P [phone] - F [phone]  [address]  [facility] [address]  Phone: [phone] Fax: [phone]    [facility] - [place], [place] - [address] [facility] - P [phone] - F [phone]  [address] [facility] [address]  Phone: [phone] Fax: [phone]      Notes:    Factors facilitating achievement of predicted outcomes: Family support, Motivated, Cooperative, Pleasant, and Good insight into deficits    Barriers to discharge: Medical complications    Additional Case Management Notes: Plan is home independent, spouse not able to assist, daughter provides support.  No transitional planning needs.  Has used [facility] in past for HHC.    The Plan for Transition of Care is related to the following treatment goals of Hernia of abdominal cavity [K46.9]  Ventral hernia without obstruction or gangrene [K43.9]  Lower abdominal pain [R10.30]    IF APPLICABLE: The Patient and/or patient representative Segundo and his family were provided with a choice of provider and agrees with the discharge plan. Freedom of choice list with basic dialogue that supports the patient's individualized plan of care/goals and shares the quality data associated with the providers was provided to:     Patient Representative Name:       The Patient and/or Patient Representative Agree with the Discharge Plan? Yes      Sabine Yang RN  Case Management Department  Ph:  Fax:

## 2024-05-08 NOTE — ED PROVIDER NOTES
normal.   Eyes:      General: Lids are normal.         Right eye: No discharge.         Left eye: No discharge.   Neck:      Trachea: No tracheal deviation.   Cardiovascular:      Rate and Rhythm: Normal rate and regular rhythm.   Pulmonary:      Effort: Pulmonary effort is normal.      Breath sounds: Normal breath sounds.   Abdominal:      Palpations: Abdomen is soft. There is no pulsatile mass.      Tenderness: There is no abdominal tenderness.      Comments: No appreciable distention.  Abdomen is otherwise soft.  Colostomy in place.  No appreciable tenderness on exam when I saw the patient.  No peritoneal signs.   Skin:     General: Skin is warm and dry.   Neurological:      Mental Status: He is alert.      GCS: GCS eye subscore is 4. GCS verbal subscore is 5. GCS motor subscore is 6.   Psychiatric:         Behavior: Behavior normal.           DIFFERENTIAL DIAGNOSIS/ MDM:     At this time the plan will be to initiate a further GI workup.  Due to his history of feel CT scan is appropriate.    Differential diagnosis considered: Obstruction versus other unknown intra-abdominal etiology versus hernia    Chronic Conditions affecting care (DM,HTN,CA, etc): See past medical history.    Social Determinants of Health affecting care (unable to care for self, lives alone, unemployed, homeless,etc): None    History source(s) (patient,spouse,parent,family,friend,EMS,etc): Patient and    Review of external sources (ECF,Hospital records,EMS report, radiology reports, etc): Reviewed    Tests considered but not ordered: Not applicable    Independent interpretation of tests (eg.  X-ray, CAT scan, Doppler studies, EKG): ECG interpreted by myself if completed.    Discussion of x-ray results with radiology: See below    Consults: See below    Consideration for admission/observation (even if discharged): Considered    Prescription considerations: Not applicable    Sepsis considered: Considered but unlikely    Critical Care note

## 2024-05-08 NOTE — PROGRESS NOTES
Patient stable for discharge.   IV removed all bleeding stopped.  Patient verbalized understanding of all discharge instructions.  Patients belongings were packed up and carried down to daughters car by daughter.  Patient wheeled down to daughters car to be driven home by daughter.    
of colon or small bowel without evidence of obstruction or inflammatory change at these other hernias. 3.  Cholelithiasis without findings of acute cholecystitis. 4. Small hiatal hernia. 5. Prominent fat containing peristomal hernia.       Physical Examination:     General appearance:  alert, cooperative and no distress  Mental Status:  oriented to person, place and time and normal affect  Lungs:  clear to auscultation bilaterally, normal effort  Heart:  regular rate and rhythm, no murmur  Abdomen:  soft, nontender, nondistended, normal bowel sounds, no masses, hepatomegaly, splenomegaly.  Old healed midline surgical scar.  Has colostomy bag.  Extremities:  no edema, redness, tenderness in the calves  Skin:  no gross lesions, rashes, induration    Assessment:     Hospital Problems             Last Modified POA    * (Principal) Ventral hernia without obstruction or gangrene 5/7/2024 Yes    History of colon cancer 5/8/2024 Yes    Hypertensive urgency 5/8/2024 Yes    BPH (benign prostatic hyperplasia) 5/8/2024 Yes       Plan:     Ventral hernia without obstruction  Treatment per primary team  Pain management per primary  History of colon cancer s/p resection and colostomy.  Outpatient follow up with oncology   Hypertensive urgency  Resume home Lisinopril  PO clonidine replaced with clonidine patch by ER staff   PRN hydralazine for SBP>160   Monitor and optimize BP  BPH with urinary retention   Resume home finasteride, Flomax    Medical Decision Making: Medium      Trevon Polanco MD  5/8/2024  2:14 PM

## 2024-05-08 NOTE — CONSULTS
[facility]  Office: [phone]  David Valdez DO, James Tran DO, Jesus Mcgill DO, Mario Dutton DO, Lorna Coleman MD, Nazanin Gonzales MD, Otilio Vázquez MD, Radha Lobo MD,  Wale Viramontes MD, Elsi Almeida MD, Smith Banda MD,  Rhea Caldwell DO, Aleksandar Pyle MD, Law Bueno MD, Jaxon Valdez DO, Mi Duggan MD,  Errol Serrano DO, Elizabeth Christian MD, Ann Torrez MD, Laureen Del Valle MD, Konstantin Hernandez MD,  Rex Lopez MD, Abbey Grayson MD, Mateusz Groves MD, Janet Dick MD, Trevon Polanco MD, Valeriy Henry MD, Eze Ferreira DO, Mickey Villanueva DO, Dorothy Treadwell MD,  Nelson Jenkins MD, Shirley Waterhouse, CNP,  Tamra Browning CNP, Lazaro Naik, CNP,  Mariaelena Goodwin, DNP, Kristine Hernández, CNP, Lorin Lin, CNP, Melly Carter, CNP, Nkechi Benitez, CNP, Claire Ogden, PA-C, Ceci Le PA-C, Lisa Lechuga, CNP, Eliana Randhawa, CNP, Umu Solorzano, CNP, Kym Arenas, CNP, Bethany Moreau, CNP, Lamar Nelson, CNS, Susy Ash, CNP, Tosha Christopher CNP, Tracy Schwab, CNP         [facility]   IN-PATIENT SERVICE   [facility]    CONSULTATION / HISTORY AND PHYSICAL EXAMINATION            Date:   5/8/2024  Patient name:  Segundo Mcmullen  Date of admission:  5/7/2024  8:05 PM  MRN:   [id number]  Account:  [id number]  YOB: 1940  PCP:    Laz Nash MD  Room:   [address] [place] [address]  Code Status:    Full Code    Physician Requesting Consult: Alvarez Vernon MD    Reason for Consult:  medical management    Chief Complaint:     Chief Complaint   Patient presents with    Abdominal Pain     Pt states abd pain that began this morning. Pt states he thinks he has a \"blockage\". Unable to explain why. Pt does have a colostomy bag. Pt states he saw a GI docotr a long time ago but has not seen anyone is a long time, states no one manages his colostomy bag. Pt denies vomiting.      History Obtained From:     patient, electronic medical

## 2024-05-08 NOTE — H&P
Chief complaint: Abdominal discomfort and fullness    History of present illness  Patient is an 84-year-old male known to me.  He is a previous history of left-sided colon cancer postresection and colostomy with attempted and failed reattempt at colostomy takedown.  Patient believes that 84 is a simply going to live with his colostomy.    Patient is been doing quite a bit of lifting recently.  This includes some boxes of tile that he had in his garage as well as his wife who falls frequently needs to be assisted off the floor.  She is not very large but she is pretty much a dead weight when this happens.  This is patient's second episode of what he thinks was an incarcerated hernia.  He was complaining about discomfort in the hernia that is in the most upper portion of his midline incision and some sensation of nausea but no vomiting.  This was yesterday.  The patient today states he is feeling much better the hernia is easily reducible his pain is gone a sensation of fullness is gone.  He is tolerating liquids and had a large bowel movement today.  He is eager to try some regular food.    Of note the patient tells me he just underwent a Cologuard test which was positive.  He is waiting for his primary care physician to arrange colonoscopy for him for this.    Past medical history  History colon cancer  Ventral hernia  Hypertension  BPH  Bowel obstruction  Anxiety  Ocular nevus  Ganglion cyst  Elevated cholesterol  Positive Cologuard    Past surgical history  Colon resection with colostomy and failed attempted colostomy takedown  Eye surgery  Back surgery    Social history  Patient has never smoked and very rarely drinks.    Allergies are to penicillin and iodine    Review of systems  Back pain  Arthritis  Mild intermittent constipation  Frequency and nocturia    Physical examination  84-year-old gentleman in no acute distress.  Appears to have gained some weight since last I saw him.  He is currently 210

## 2024-05-08 NOTE — DISCHARGE INSTR - COC
(benign prostatic hyperplasia) N40.0       Isolation/Infection:   Isolation            No Isolation          Patient Infection Status       None to display            Nurse Assessment:  Last Vital Signs: BP (!) 165/82   Pulse 80   Temp 97.9 °F (36.6 °C) (Oral)   Resp 15   Ht 1.676 m (5' 6\")   Wt 95.3 kg (210 lb)   SpO2 95%   BMI 33.89 kg/m²     Last documented pain score (0-10 scale): Pain Level: 3  Last Weight:   Wt Readings from Last 1 Encounters:   05/07/24 95.3 kg (210 lb)     Mental Status:  {IP PT MENTAL STATUS:20030}    IV Access:  { FLETCHER IV ACCESS:877955270}    Nursing Mobility/ADLs:  Walking   {CHP DME ADLs:922405229}  Transfer  {CHP DME ADLs:452644919}  Bathing  {CHP DME ADLs:853014742}  Dressing  {CHP DME ADLs:536829271}  Toileting  {CHP DME ADLs:968837522}  Feeding  {CHP DME ADLs:538083195}  Med Admin  {CHP DME ADLs:607489305}  Med Delivery   { FLETCHER MED Delivery:439784258}    Wound Care Documentation and Therapy:        Elimination:  Continence:   Bowel: {YES / NO:19727}  Bladder: {YES / NO:19727}  Urinary Catheter: {Urinary Catheter:476087444}   Colostomy/Ileostomy/Ileal Conduit: {YES / NO:19727}       Date of Last BM: ***    Intake/Output Summary (Last 24 hours) at 5/8/2024 1741  Last data filed at 5/7/2024 2157  Gross per 24 hour   Intake 1000 ml   Output --   Net 1000 ml     I/O last 3 completed shifts:  In: 1000 [IV Piggyback:1000]  Out: -     Safety Concerns:     { FLETCHER Safety Concerns:232630408}    Impairments/Disabilities:      { FLETCHER Impairments/Disabilities:793709124}    Nutrition Therapy:  Current Nutrition Therapy:   { FLETCHER Diet List:763413101}    Routes of Feeding: {CHP DME Other Feedings:366411072}  Liquids: {Slp liquid thickness:19012}  Daily Fluid Restriction: {CHP DME Yes amt example:216554889}  Last Modified Barium Swallow with Video (Video Swallowing Test): {Done Not Done Date:304088012}    Treatments at the Time of Hospital Discharge:   Respiratory Treatments: ***  Oxygen

## 2024-05-10 NOTE — DISCHARGE SUMMARY
Discharge summary    Patient is an 84-year-old gentleman known to our service.  He has a previous history of a left-sided colon cancer couple of years ago which was treated emergently and then developed anastomotic leak requiring House's and attempted takedown also developed a second leak.  He is therefore now with likely a permanent colostomy.    Patient has a known hernia of the midline incision and a couple of spots.  He has been doing quite a bit of lifting recently with his wife who needs quite a bit of care and also with boxes of tile he stayed in his garage.  He noticed that he was having some discomfort at the uppermost hernia in the epigastric area as well as some mild nausea and was brought to the emergency room.    Clinical examination and CT scan were consistent with possible partial small bowel obstruction.  The hernia however was able to be reduced and the patient had resolution of his symptomatology.  He was started on diet which she tolerated and was thereafter to be discharged home.    Patient was cautioned against any heavy lifting.  We discussed the possibility of hernia repairs and this can be done either in an open or laparoscopic fashion although would have to be careful with mesh with his stoma present.    Patient is given instructions to come back to the office or the ER urgently should his obstructive symptoms recur.    Condition at discharge is good  Discharge is to home  Date of discharge is 5/8/2024.  Patient is to follow-up in office as needed

## 2024-12-30 ENCOUNTER — HOSPITAL ENCOUNTER (INPATIENT)
Age: 84
LOS: 1 days | Discharge: HOME | DRG: 395 | End: 2024-12-31
Attending: EMERGENCY MEDICINE | Admitting: SURGERY
Payer: MEDICARE

## 2024-12-30 ENCOUNTER — APPOINTMENT (OUTPATIENT)
Dept: CT IMAGING | Age: 84
DRG: 395 | End: 2024-12-30
Payer: MEDICARE

## 2024-12-30 DIAGNOSIS — K43.6 VENTRAL HERNIA WITH BOWEL OBSTRUCTION: ICD-10-CM

## 2024-12-30 DIAGNOSIS — K56.609 SMALL BOWEL OBSTRUCTION (HCC): Primary | ICD-10-CM

## 2024-12-30 LAB
ALBUMIN SERPL-MCNC: 4.2 G/DL (ref 3.5–5.2)
ALBUMIN/GLOB SERPL: 1.2 {RATIO} (ref 1–2.5)
ALP SERPL-CCNC: 77 U/L (ref 40–129)
ALT SERPL-CCNC: 11 U/L (ref 5–41)
ANION GAP SERPL CALCULATED.3IONS-SCNC: 12 MMOL/L (ref 9–17)
AST SERPL-CCNC: 13 U/L
BASOPHILS # BLD: 0.1 K/UL (ref 0–0.2)
BASOPHILS NFR BLD: 1 % (ref 0–2)
BILIRUB DIRECT SERPL-MCNC: 0.1 MG/DL
BILIRUB INDIRECT SERPL-MCNC: 0.3 MG/DL (ref 0–1)
BILIRUB SERPL-MCNC: 0.4 MG/DL (ref 0.3–1.2)
BUN SERPL-MCNC: 24 MG/DL (ref 8–23)
CALCIUM SERPL-MCNC: 9.3 MG/DL (ref 8.6–10.4)
CHLORIDE SERPL-SCNC: 104 MMOL/L (ref 98–107)
CO2 SERPL-SCNC: 22 MMOL/L (ref 20–31)
CREAT SERPL-MCNC: 1.4 MG/DL (ref 0.7–1.2)
EOSINOPHIL # BLD: 0 K/UL (ref 0–0.4)
EOSINOPHILS RELATIVE PERCENT: 0 % (ref 1–4)
ERYTHROCYTE [DISTWIDTH] IN BLOOD BY AUTOMATED COUNT: 14.4 % (ref 12.5–15.4)
GFR, ESTIMATED: 50 ML/MIN/1.73M2
GLUCOSE SERPL-MCNC: 113 MG/DL (ref 70–99)
HCT VFR BLD AUTO: 40.9 % (ref 41–53)
HGB BLD-MCNC: 13.8 G/DL (ref 13.5–17.5)
LACTATE BLDV-SCNC: 1.1 MMOL/L (ref 0.5–2.2)
LYMPHOCYTES NFR BLD: 0.5 K/UL (ref 1–4.8)
LYMPHOCYTES RELATIVE PERCENT: 4 % (ref 24–44)
MCH RBC QN AUTO: 32 PG (ref 26–34)
MCHC RBC AUTO-ENTMCNC: 33.7 G/DL (ref 31–37)
MCV RBC AUTO: 94.9 FL (ref 80–100)
MONOCYTES NFR BLD: 0.5 K/UL (ref 0.1–1.2)
MONOCYTES NFR BLD: 3 % (ref 2–11)
NEUTROPHILS NFR BLD: 92 % (ref 36–66)
NEUTS SEG NFR BLD: 12.5 K/UL (ref 1.8–7.7)
PLATELET # BLD AUTO: 258 K/UL (ref 140–450)
PMV BLD AUTO: 8.5 FL (ref 6–12)
POTASSIUM SERPL-SCNC: 4.4 MMOL/L (ref 3.7–5.3)
PROT SERPL-MCNC: 7.6 G/DL (ref 6.4–8.3)
RBC # BLD AUTO: 4.31 M/UL (ref 4.5–5.9)
SODIUM SERPL-SCNC: 138 MMOL/L (ref 135–144)
WBC OTHER # BLD: 13.5 K/UL (ref 3.5–11)

## 2024-12-30 PROCEDURE — 74176 CT ABD & PELVIS W/O CONTRAST: CPT

## 2024-12-30 PROCEDURE — 36415 COLL VENOUS BLD VENIPUNCTURE: CPT

## 2024-12-30 PROCEDURE — 80076 HEPATIC FUNCTION PANEL: CPT

## 2024-12-30 PROCEDURE — 6370000000 HC RX 637 (ALT 250 FOR IP): Performed by: SURGERY

## 2024-12-30 PROCEDURE — 80048 BASIC METABOLIC PNL TOTAL CA: CPT

## 2024-12-30 PROCEDURE — 2500000003 HC RX 250 WO HCPCS: Performed by: SURGERY

## 2024-12-30 PROCEDURE — 83605 ASSAY OF LACTIC ACID: CPT

## 2024-12-30 PROCEDURE — 2580000003 HC RX 258: Performed by: SURGERY

## 2024-12-30 PROCEDURE — 99285 EMERGENCY DEPT VISIT HI MDM: CPT

## 2024-12-30 PROCEDURE — 1210000000 HC MED SURG R&B

## 2024-12-30 PROCEDURE — 85025 COMPLETE CBC W/AUTO DIFF WBC: CPT

## 2024-12-30 RX ORDER — ONDANSETRON 2 MG/ML
4 INJECTION INTRAMUSCULAR; INTRAVENOUS EVERY 4 HOURS PRN
Status: DISCONTINUED | OUTPATIENT
Start: 2024-12-30 | End: 2024-12-31 | Stop reason: HOSPADM

## 2024-12-30 RX ORDER — MAGNESIUM SULFATE IN WATER 40 MG/ML
2000 INJECTION, SOLUTION INTRAVENOUS PRN
Status: DISCONTINUED | OUTPATIENT
Start: 2024-12-30 | End: 2024-12-31 | Stop reason: HOSPADM

## 2024-12-30 RX ORDER — ENOXAPARIN SODIUM 100 MG/ML
40 INJECTION SUBCUTANEOUS DAILY
Status: DISCONTINUED | OUTPATIENT
Start: 2024-12-31 | End: 2024-12-31 | Stop reason: HOSPADM

## 2024-12-30 RX ORDER — LISINOPRIL 10 MG/1
10 TABLET ORAL DAILY
Status: DISCONTINUED | OUTPATIENT
Start: 2024-12-30 | End: 2024-12-31 | Stop reason: HOSPADM

## 2024-12-30 RX ORDER — TAMSULOSIN HYDROCHLORIDE 0.4 MG/1
0.4 CAPSULE ORAL DAILY
Status: DISCONTINUED | OUTPATIENT
Start: 2024-12-31 | End: 2024-12-31 | Stop reason: HOSPADM

## 2024-12-30 RX ORDER — SODIUM CHLORIDE 0.9 % (FLUSH) 0.9 %
5-40 SYRINGE (ML) INJECTION EVERY 12 HOURS SCHEDULED
Status: DISCONTINUED | OUTPATIENT
Start: 2024-12-30 | End: 2024-12-31 | Stop reason: HOSPADM

## 2024-12-30 RX ORDER — FINASTERIDE 5 MG/1
5 TABLET, FILM COATED ORAL DAILY
Status: DISCONTINUED | OUTPATIENT
Start: 2024-12-31 | End: 2024-12-31 | Stop reason: HOSPADM

## 2024-12-30 RX ORDER — SODIUM CHLORIDE 9 MG/ML
INJECTION, SOLUTION INTRAVENOUS PRN
Status: DISCONTINUED | OUTPATIENT
Start: 2024-12-30 | End: 2024-12-31 | Stop reason: HOSPADM

## 2024-12-30 RX ORDER — ACETAMINOPHEN 650 MG/1
650 SUPPOSITORY RECTAL EVERY 6 HOURS PRN
Status: DISCONTINUED | OUTPATIENT
Start: 2024-12-30 | End: 2024-12-31 | Stop reason: HOSPADM

## 2024-12-30 RX ORDER — CLONIDINE HYDROCHLORIDE 0.1 MG/1
0.3 TABLET ORAL 2 TIMES DAILY
Status: DISCONTINUED | OUTPATIENT
Start: 2024-12-30 | End: 2024-12-31 | Stop reason: HOSPADM

## 2024-12-30 RX ORDER — DEXTROSE MONOHYDRATE AND SODIUM CHLORIDE 5; .45 G/100ML; G/100ML
INJECTION, SOLUTION INTRAVENOUS CONTINUOUS
Status: DISCONTINUED | OUTPATIENT
Start: 2024-12-30 | End: 2024-12-31

## 2024-12-30 RX ORDER — DIPHENHYDRAMINE HYDROCHLORIDE 50 MG/ML
12.5 INJECTION INTRAMUSCULAR; INTRAVENOUS NIGHTLY PRN
Status: DISCONTINUED | OUTPATIENT
Start: 2024-12-30 | End: 2024-12-31 | Stop reason: HOSPADM

## 2024-12-30 RX ORDER — POTASSIUM CHLORIDE 1500 MG/1
40 TABLET, EXTENDED RELEASE ORAL PRN
Status: DISCONTINUED | OUTPATIENT
Start: 2024-12-30 | End: 2024-12-31 | Stop reason: HOSPADM

## 2024-12-30 RX ORDER — ACETAMINOPHEN 325 MG/1
650 TABLET ORAL EVERY 6 HOURS PRN
Status: DISCONTINUED | OUTPATIENT
Start: 2024-12-30 | End: 2024-12-31 | Stop reason: HOSPADM

## 2024-12-30 RX ORDER — SODIUM CHLORIDE 0.9 % (FLUSH) 0.9 %
5-40 SYRINGE (ML) INJECTION PRN
Status: DISCONTINUED | OUTPATIENT
Start: 2024-12-30 | End: 2024-12-31 | Stop reason: HOSPADM

## 2024-12-30 RX ORDER — POTASSIUM CHLORIDE 7.45 MG/ML
10 INJECTION INTRAVENOUS PRN
Status: DISCONTINUED | OUTPATIENT
Start: 2024-12-30 | End: 2024-12-31 | Stop reason: HOSPADM

## 2024-12-30 RX ADMIN — SODIUM CHLORIDE, PRESERVATIVE FREE 10 ML: 5 INJECTION INTRAVENOUS at 22:39

## 2024-12-30 RX ADMIN — LISINOPRIL 10 MG: 10 TABLET ORAL at 22:41

## 2024-12-30 RX ADMIN — DEXTROSE AND SODIUM CHLORIDE: 5; 450 INJECTION, SOLUTION INTRAVENOUS at 22:35

## 2024-12-30 RX ADMIN — CLONIDINE HYDROCHLORIDE 0.3 MG: 0.1 TABLET ORAL at 22:37

## 2024-12-30 ASSESSMENT — PAIN SCALES - GENERAL: PAINLEVEL_OUTOF10: 9

## 2024-12-30 ASSESSMENT — PAIN DESCRIPTION - LOCATION: LOCATION: ABDOMEN

## 2024-12-30 ASSESSMENT — PAIN - FUNCTIONAL ASSESSMENT: PAIN_FUNCTIONAL_ASSESSMENT: 0-10

## 2024-12-31 VITALS
RESPIRATION RATE: 17 BRPM | HEIGHT: 66 IN | DIASTOLIC BLOOD PRESSURE: 85 MMHG | WEIGHT: 210 LBS | TEMPERATURE: 97.9 F | HEART RATE: 92 BPM | BODY MASS INDEX: 33.75 KG/M2 | OXYGEN SATURATION: 96 % | SYSTOLIC BLOOD PRESSURE: 157 MMHG

## 2024-12-31 LAB
ANION GAP SERPL CALCULATED.3IONS-SCNC: 7 MMOL/L (ref 9–17)
BASOPHILS # BLD: 0.02 K/UL (ref 0–0.2)
BASOPHILS NFR BLD: 0 % (ref 0–2)
BUN SERPL-MCNC: 20 MG/DL (ref 8–23)
CALCIUM SERPL-MCNC: 8.5 MG/DL (ref 8.6–10.4)
CHLORIDE SERPL-SCNC: 109 MMOL/L (ref 98–107)
CO2 SERPL-SCNC: 23 MMOL/L (ref 20–31)
CREAT SERPL-MCNC: 1.2 MG/DL (ref 0.7–1.2)
EOSINOPHIL # BLD: 0.31 K/UL (ref 0–0.4)
EOSINOPHILS RELATIVE PERCENT: 4 % (ref 1–4)
ERYTHROCYTE [DISTWIDTH] IN BLOOD BY AUTOMATED COUNT: 13.4 % (ref 12.5–15.4)
GFR, ESTIMATED: 60 ML/MIN/1.73M2
GLUCOSE SERPL-MCNC: 113 MG/DL (ref 70–99)
HCT VFR BLD AUTO: 37.4 % (ref 41–53)
HGB BLD-MCNC: 12.2 G/DL (ref 13.5–17.5)
LYMPHOCYTES NFR BLD: 1.14 K/UL (ref 1–4.8)
LYMPHOCYTES RELATIVE PERCENT: 14 % (ref 24–44)
MCH RBC QN AUTO: 31.8 PG (ref 26–34)
MCHC RBC AUTO-ENTMCNC: 32.6 G/DL (ref 31–37)
MCV RBC AUTO: 97.4 FL (ref 80–100)
MONOCYTES NFR BLD: 0.88 K/UL (ref 0.1–1.2)
MONOCYTES NFR BLD: 11 % (ref 2–11)
NEUTROPHILS NFR BLD: 71 % (ref 36–66)
NEUTS SEG NFR BLD: 5.73 K/UL (ref 1.8–7.7)
PLATELET # BLD AUTO: 236 K/UL (ref 140–450)
PMV BLD AUTO: 10.8 FL (ref 8–14)
POTASSIUM SERPL-SCNC: 4 MMOL/L (ref 3.7–5.3)
RBC # BLD AUTO: 3.84 M/UL (ref 4.5–5.9)
SODIUM SERPL-SCNC: 139 MMOL/L (ref 135–144)
WBC OTHER # BLD: 8.1 K/UL (ref 3.5–11)

## 2024-12-31 PROCEDURE — 80048 BASIC METABOLIC PNL TOTAL CA: CPT

## 2024-12-31 PROCEDURE — 6370000000 HC RX 637 (ALT 250 FOR IP): Performed by: SURGERY

## 2024-12-31 PROCEDURE — 85025 COMPLETE CBC W/AUTO DIFF WBC: CPT

## 2024-12-31 PROCEDURE — 36415 COLL VENOUS BLD VENIPUNCTURE: CPT

## 2024-12-31 PROCEDURE — 2500000003 HC RX 250 WO HCPCS: Performed by: SURGERY

## 2024-12-31 PROCEDURE — 2580000003 HC RX 258: Performed by: SURGERY

## 2024-12-31 RX ADMIN — FINASTERIDE 5 MG: 5 TABLET, FILM COATED ORAL at 09:10

## 2024-12-31 RX ADMIN — DEXTROSE AND SODIUM CHLORIDE: 5; 450 INJECTION, SOLUTION INTRAVENOUS at 11:26

## 2024-12-31 RX ADMIN — TAMSULOSIN HYDROCHLORIDE 0.4 MG: 0.4 CAPSULE ORAL at 09:10

## 2024-12-31 RX ADMIN — SODIUM CHLORIDE, PRESERVATIVE FREE 20 MG: 5 INJECTION INTRAVENOUS at 09:11

## 2024-12-31 NOTE — PROGRESS NOTES
Pt wanted discharge/ ok with not receiving discharge papers. Medication reconciliation not updated by provider/perfect serve Dr. Vernon/waiting for response.

## 2024-12-31 NOTE — DISCHARGE SUMMARY
Physician Discharge Summary     Patient ID:  Segundo Mcmullen  [id number]  84 y.o.  1940    Admit date: 12/30/2024    Discharge date and time: 12/31/2024    Admitting Physician: Courtney Gilbert MD     Discharge Physician: Dr. Courtney reardon    Admission Diagnoses: Small bowel obstruction (HCC) [K56.609]  Ventral hernia with bowel obstruction [K43.6]    Discharge Diagnoses: Incisional hernia and small bowel obstruction    Admission Condition: fair    Discharged Condition: good    Indication for Admission: Small bowel obstruction secondary to incisional hernia    Hospital Course: Patient presented with 4 ventral hernias at his old incision site and ER was able to reduce I thought at least 3 of the 4.  The fourth 1 was questionably partially reduced but patient states by the following morning the bulge she had there was totally gone, he was tolerating his diet and small bowel follow-through which showed contrast into his colon.  Stoma was functioning well.    Consults: none    Significant Diagnostic Studies: radiology: CT scan and small bowel follow-through    Treatments: Patient treated with conservative therapy with reduction of hernias and small bowel follow-through.  Diet was started and to be discharged after tolerating this.  Patient stated a low fiber diet    Discharge Exam:  Patient awake alert and oriented  Ambulating well  Stoma producing copious stool  All hernia superior to have been reduced.    Disposition: home    In process/preliminary results:  Outstanding Order Results       No orders found for last 30 day(s).            Patient Instructions:   Current Discharge Medication List        CONTINUE these medications which have NOT CHANGED    Details   lisinopril (PRINIVIL;ZESTRIL) 10 MG tablet Take 1 tablet by mouth daily      ferrous sulfate (IRON 325) 325 (65 Fe) MG tablet Take 1 tablet by mouth daily      cloNIDine (CATAPRES) 0.3 MG tablet Take 1 tablet by mouth 2 times daily  Qty: 180 tablet, Refills: 3

## 2024-12-31 NOTE — ED NOTES
ED to inpatient nurses report      Chief Complaint:  Chief Complaint   Patient presents with    Abdominal Pain     Pt arrives with co abdominal pain. Pt states last night he had diarrhea that made his bag come off. Today pt states he has had nothing noted to be coming out. Pt has a colostomy with several noted abdominal surgeries.      Present to ED from: Home    MOA:     LOC: alert and orientated to name, place, date  Mobility: Independent  Oxygen Baseline: Room air    Current needs required: Room air   Pending ED orders: None  Present condition: Stable    Why did the patient come to the ED? Abdominal pain and no out put in colostomy   What is the plan? Admit for Dr. Gilbert to see  Any procedures or intervention occur? None  Any safety concerns??Fall risk  CODE STATUS Full Code  Diet Diet NPO Exceptions are: Sips of Water with Meds    Mental Status:       Psych Assessment:   Psychosocial  Psychosocial (WDL): Within Defined Limits  Vital signs   Vitals:    12/30/24 1654 12/30/24 2136 12/30/24 2241 12/31/24 0300   BP: (!) 200/99 (!) 173/89 (!) 190/103 122/68   Pulse: 78  88 92   Resp: 16  19 17   Temp: 97.9 °F (36.6 °C)  98.4 °F (36.9 °C) 97.9 °F (36.6 °C)   TempSrc:   Oral Oral   SpO2: 98%  97% 96%   Weight: 95.3 kg (210 lb)      Height: 1.676 m (5' 6\")           Vitals:  Patient Vitals for the past 24 hrs:   BP Temp Temp src Pulse Resp SpO2 Height Weight   12/31/24 0300 122/68 97.9 °F (36.6 °C) Oral 92 17 96 % -- --   12/30/24 2241 (!) 190/103 98.4 °F (36.9 °C) Oral 88 19 97 % -- --   12/30/24 2136 (!) 173/89 -- -- -- -- -- -- --   12/30/24 1654 (!) 200/99 97.9 °F (36.6 °C) -- 78 16 98 % 1.676 m (5' 6\") 95.3 kg (210 lb)      Visit Vitals  /68   Pulse 92   Temp 97.9 °F (36.6 °C) (Oral)   Resp 17   Ht 1.676 m (5' 6\")   Wt 95.3 kg (210 lb)   SpO2 96%   BMI 33.89 kg/m²        LDAs:   Peripheral IV Right Wrist (Active)   Site Assessment Clean, dry & intact 12/31/24 0300   Line Status Blood return noted;Normal

## 2024-12-31 NOTE — ED PROVIDER NOTES
[facility] Emergency Department  [address]  [facility] [address]  Phone: [phone]  Fax: [phone]      Pt Name: Segundo Mcmullen  MRN:[id number]  Birthdate 1940  Date of evaluation: 12/30/2024      CHIEF COMPLAINT       Chief Complaint   Patient presents with    Abdominal Pain     Pt arrives with co abdominal pain. Pt states last night he had diarrhea that made his bag come off. Today pt states he has had nothing noted to be coming out. Pt has a colostomy with several noted abdominal surgeries.        HISTORY OF PRESENT ILLNESS   84-year-old male presents here with complaints of abdominal pain, concern for small bowel obstruction.  States that last night after eating dinner he had copious amount of output through his colostomy, but since he has emptied the bag, has had essentially nothing out today.  Has had an increase in pain and pressure along the midline of the abdomen at the site of known ventral hernia.  He has experienced multiple obstruction secondary to this hernia in the past.  This all originated following a resection of colon cancer, post House's procedure complicated by an anastomotic leak.  He says most recently he has seen Dr. Gilbert.  Nausea without vomiting.  No fever or chills.    REVIEWOF SYSTEMS     Review of Systems      PAST MEDICAL HISTORY    has a past medical history of Choroid melanoma of left eye (HCC), Colon cancer (HCC), Hyperlipidemia, Hypertension, and [place] eye.    SURGICAL HISTORY      has a past surgical history that includes Spine surgery; Eye surgery (Left); and colostomy.    CURRENTMEDICATIONS       Previous Medications    CLONIDINE (CATAPRES) 0.3 MG TABLET    Take 1 tablet by mouth 2 times daily    CYANOCOBALAMIN 1000 MCG/ML INJECTION    Inject 1 mL into the muscle once PATIENT UNSURE OF DOSE - GET INJECTION ONCE A MONTH AT DR. JOHN OFFICE    FERROUS SULFATE (IRON 325) 325 (65 FE) MG TABLET    Take 1 tablet by mouth daily    FINASTERIDE

## 2024-12-31 NOTE — CARE COORDINATION
Case Management Assessment  Initial Evaluation    Date/Time of Evaluation: 12/31/2024 3:08 PM  Assessment Completed by: Janine Caruso    If patient is discharged prior to next notation, then this note serves as note for discharge by case management.    Patient Name: Segundo Mcmullen                   YOB: 1940  Diagnosis: Small bowel obstruction (HCC) [K56.609]  Ventral hernia with bowel obstruction [K43.6]                   Date / Time: 12/30/2024  4:48 PM    Patient Admission Status: Inpatient   Readmission Risk (Low < 19, Mod (19-27), High > 27): Readmission Risk Score: 7.6    Current PCP: Laz Nash MD  PCP verified by CM? Yes    Chart Reviewed: Yes      History Provided by: Patient  Patient Orientation: Alert and Oriented    Patient Cognition: Alert    Hospitalization in the last 30 days (Readmission):  No    If yes, Readmission Assessment in CM Navigator will be completed.    Advance Directives:      Code Status: Full Code   Patient's Primary Decision Maker is: Legal Next of Kin (daughter, Minnie)      Discharge Planning:    Patient lives with: Spouse/Significant Other Type of Home: House  Primary Care Giver: Self  Patient Support Systems include: Spouse/Significant Other   Current Financial resources: Medicare  Current community resources: None  Current services prior to admission: None            Current DME:              Type of Home Care services:  None    ADLS  Prior functional level: Independent in ADLs/IADLs  Current functional level: Independent in ADLs/IADLs    PT AM-PAC:   /24  OT AM-PAC:   /24    Family can provide assistance at DC: Yes  Would you like Case Management to discuss the discharge plan with any other family members/significant others, and if so, who? No  Plans to Return to Present Housing: Yes  Other Identified Issues/Barriers to RETURNING to current housing: none  Potential Assistance needed at discharge: N/A (TBD)            Potential DME:    Patient expects to

## 2024-12-31 NOTE — PROGRESS NOTES
Pharmacy Note     Renal Dose Adjustment    Segundo Mcmullen is a 84 y.o. male. Pharmacist assessment of renally cleared medications.    Recent Labs     12/30/24  1600   BUN 24*       Recent Labs     12/30/24  1600   CREATININE 1.4*       Estimated Creatinine Clearance: 42 mL/min (A) (based on SCr of 1.4 mg/dL (H)).  Estimated CrCl using Ideal Body Weight: 35 mL/min (based on IBW 63.8 kg)    Height:   Ht Readings from Last 1 Encounters:   12/30/24 1.676 m (5' 6\")     Weight:  Wt Readings from Last 1 Encounters:   12/30/24 95.3 kg (210 lb)       The following medication dose has been adjusted based upon renal function per P&T Guidelines:             Famotidine 20mg twice daily changed to once daily.

## 2024-12-31 NOTE — H&P
Chief complaint bowel obstruction  Patient is an 84-year-old gentleman known to our service.  He said previous colon cancer surgery with resection and anastomosis, anastomotic leak and colostomy.  He is decided to stay with the colostomy and not have reattempts at the anastomosis.    Patient does quite a bit of lifting at home as his wife has dementia and he is to assist with her quite a bit.    Patient states for about the last 8 a day and a half he was having some abdominal pain and discomfort on the midline incision some bloating nausea and vomiting.  He presented through the emergency room.  By examination CT scan patient was found to have a an incarcerated hernia secondary to a small bowel within multiple fascial defects.  The ER was able to reduce the bottom 3 of the 4 and thought they would likely reduce the top 1.  Patient was admitted to the hospital for observation.  Small bowel follow-through was ordered.  Patient has now had 3 bowel movements a day and feeling much better with no further abdominal pain at all.  He is getting a bit hungry.    Past medical history  Small bowel obstruction  Anxiety  Colon cancer  Hypertension  Ganglion cyst  Ventral hernias  Hypertension  Elevated cholesterol  BPH  History left eye choroid melanoma    Past surgical history  Colon resection anastomosis in the colostomy creation  Left eye surgery  Previous spine surgery    Social history  Patient does not smoke very rarely drinks.    Allergies are to penicillin and iodine.    Review of systems  Colostomy status  Arthritis  Vision problems left eye  Nocturia    Home medications  Clonidine Cyanobalamin  Proscar  Finasteride  Lisinopril  Timolol    Physical examination 84-year-old gentleman awake alert and oriented.  He is up walking around his room without any difficulties  Patient states he said 3 times she has had to empty his colostomy bag so far today  Patient states he is having no further pain.    HEENT exam

## 2024-12-31 NOTE — ED NOTES
Pt ambulatory to restroom gait steady; states on returning that ostomy bag has sl leakage from the side; on assessment only scant amount of stool leaking from site; pt states had some gas. Writer cleaning out ostomy bag of soft formed stool; irrigation completed and opsite dressing applied to cover small area on ostomy dressing w/ no further leakage noted. Sealed and intact.

## 2024-12-31 NOTE — PLAN OF CARE
Problem: Discharge Planning  Goal: Discharge to home or other facility with appropriate resources  Outcome: Adequate for Discharge  Flowsheets (Taken 12/31/2024 6921)  Discharge to home or other facility with appropriate resources:   Identify barriers to discharge with patient and caregiver   Arrange for needed discharge resources and transportation as appropriate   Identify discharge learning needs (meds, wound care, etc)   Refer to discharge planning if patient needs post-hospital services based on physician order or complex needs related to functional status, cognitive ability or social support system   Arrange for interpreters to assist at discharge as needed

## 2025-01-14 ENCOUNTER — HOSPITAL ENCOUNTER (EMERGENCY)
Age: 85
Discharge: HOME | End: 2025-01-14
Attending: EMERGENCY MEDICINE
Payer: MEDICARE

## 2025-01-14 ENCOUNTER — APPOINTMENT (OUTPATIENT)
Dept: CT IMAGING | Age: 85
End: 2025-01-14
Payer: MEDICARE

## 2025-01-14 VITALS
DIASTOLIC BLOOD PRESSURE: 114 MMHG | SYSTOLIC BLOOD PRESSURE: 176 MMHG | HEIGHT: 66 IN | BODY MASS INDEX: 32.14 KG/M2 | WEIGHT: 200 LBS | HEART RATE: 99 BPM | RESPIRATION RATE: 18 BRPM | TEMPERATURE: 98 F | OXYGEN SATURATION: 96 %

## 2025-01-14 DIAGNOSIS — R10.84 GENERALIZED ABDOMINAL PAIN: Primary | ICD-10-CM

## 2025-01-14 LAB
ALBUMIN SERPL-MCNC: 4.1 G/DL (ref 3.5–5.2)
ALBUMIN/GLOB SERPL: 1.4 {RATIO} (ref 1–2.5)
ALP SERPL-CCNC: 74 U/L (ref 40–129)
ALT SERPL-CCNC: <5 U/L (ref 5–41)
ANION GAP SERPL CALCULATED.3IONS-SCNC: 11 MMOL/L (ref 9–17)
AST SERPL-CCNC: 11 U/L
BASOPHILS # BLD: 0.01 K/UL (ref 0–0.2)
BASOPHILS NFR BLD: 0 % (ref 0–2)
BILIRUB SERPL-MCNC: 0.5 MG/DL (ref 0.3–1.2)
BUN SERPL-MCNC: 24 MG/DL (ref 8–23)
CALCIUM SERPL-MCNC: 9.1 MG/DL (ref 8.6–10.4)
CHLORIDE SERPL-SCNC: 108 MMOL/L (ref 98–107)
CO2 SERPL-SCNC: 22 MMOL/L (ref 20–31)
CREAT SERPL-MCNC: 1.7 MG/DL (ref 0.7–1.2)
EOSINOPHIL # BLD: 0.12 K/UL (ref 0–0.4)
EOSINOPHILS RELATIVE PERCENT: 1 % (ref 1–4)
ERYTHROCYTE [DISTWIDTH] IN BLOOD BY AUTOMATED COUNT: 13.8 % (ref 12.5–15.4)
FLUAV AG SPEC QL: NEGATIVE
FLUBV AG SPEC QL: NEGATIVE
GFR, ESTIMATED: 39 ML/MIN/1.73M2
GLUCOSE SERPL-MCNC: 166 MG/DL (ref 70–99)
HCT VFR BLD AUTO: 45.3 % (ref 41–53)
HGB BLD-MCNC: 14.6 G/DL (ref 13.5–17.5)
LACTATE BLDV-SCNC: 1.4 MMOL/L (ref 0.5–1.9)
LYMPHOCYTES NFR BLD: 0.54 K/UL (ref 1–4.8)
LYMPHOCYTES RELATIVE PERCENT: 4 % (ref 24–44)
MCH RBC QN AUTO: 31.3 PG (ref 26–34)
MCHC RBC AUTO-ENTMCNC: 32.2 G/DL (ref 31–37)
MCV RBC AUTO: 97.2 FL (ref 80–100)
MONOCYTES NFR BLD: 0.74 K/UL (ref 0.1–1.2)
MONOCYTES NFR BLD: 6 % (ref 2–11)
NEUTROPHILS NFR BLD: 89 % (ref 36–66)
NEUTS SEG NFR BLD: 11.57 K/UL (ref 1.8–7.7)
PLATELET # BLD AUTO: 265 K/UL (ref 140–450)
PMV BLD AUTO: 10.8 FL (ref 8–14)
POTASSIUM SERPL-SCNC: 5 MMOL/L (ref 3.7–5.3)
PROT SERPL-MCNC: 7.1 G/DL (ref 6.4–8.3)
RBC # BLD AUTO: 4.66 M/UL (ref 4.5–5.9)
SODIUM SERPL-SCNC: 141 MMOL/L (ref 135–144)
WBC OTHER # BLD: 13 K/UL (ref 3.5–11)

## 2025-01-14 PROCEDURE — 83605 ASSAY OF LACTIC ACID: CPT

## 2025-01-14 PROCEDURE — 2500000003 HC RX 250 WO HCPCS: Performed by: EMERGENCY MEDICINE

## 2025-01-14 PROCEDURE — 6370000000 HC RX 637 (ALT 250 FOR IP): Performed by: EMERGENCY MEDICINE

## 2025-01-14 PROCEDURE — 74177 CT ABD & PELVIS W/CONTRAST: CPT

## 2025-01-14 PROCEDURE — 6360000002 HC RX W HCPCS: Performed by: EMERGENCY MEDICINE

## 2025-01-14 PROCEDURE — 6360000004 HC RX CONTRAST MEDICATION: Performed by: EMERGENCY MEDICINE

## 2025-01-14 PROCEDURE — 80053 COMPREHEN METABOLIC PANEL: CPT

## 2025-01-14 PROCEDURE — 96374 THER/PROPH/DIAG INJ IV PUSH: CPT

## 2025-01-14 PROCEDURE — 85025 COMPLETE CBC W/AUTO DIFF WBC: CPT

## 2025-01-14 PROCEDURE — 99285 EMERGENCY DEPT VISIT HI MDM: CPT

## 2025-01-14 PROCEDURE — 87804 INFLUENZA ASSAY W/OPTIC: CPT

## 2025-01-14 RX ORDER — SODIUM CHLORIDE 0.9 % (FLUSH) 0.9 %
10 SYRINGE (ML) INJECTION PRN
Status: DISCONTINUED | OUTPATIENT
Start: 2025-01-14 | End: 2025-01-14 | Stop reason: HOSPADM

## 2025-01-14 RX ORDER — ONDANSETRON 2 MG/ML
4 INJECTION INTRAMUSCULAR; INTRAVENOUS ONCE
Status: COMPLETED | OUTPATIENT
Start: 2025-01-14 | End: 2025-01-14

## 2025-01-14 RX ORDER — 0.9 % SODIUM CHLORIDE 0.9 %
80 INTRAVENOUS SOLUTION INTRAVENOUS ONCE
Status: DISCONTINUED | OUTPATIENT
Start: 2025-01-14 | End: 2025-01-14 | Stop reason: HOSPADM

## 2025-01-14 RX ORDER — IOPAMIDOL 755 MG/ML
75 INJECTION, SOLUTION INTRAVASCULAR
Status: COMPLETED | OUTPATIENT
Start: 2025-01-14 | End: 2025-01-14

## 2025-01-14 RX ORDER — FENTANYL CITRATE 50 UG/ML
50 INJECTION, SOLUTION INTRAMUSCULAR; INTRAVENOUS ONCE
Status: COMPLETED | OUTPATIENT
Start: 2025-01-14 | End: 2025-01-14

## 2025-01-14 RX ORDER — OXYCODONE AND ACETAMINOPHEN 5; 325 MG/1; MG/1
1 TABLET ORAL ONCE
Status: COMPLETED | OUTPATIENT
Start: 2025-01-14 | End: 2025-01-14

## 2025-01-14 RX ADMIN — Medication 100 ML: at 01:44

## 2025-01-14 RX ADMIN — FENTANYL CITRATE 50 MCG: 50 INJECTION, SOLUTION INTRAMUSCULAR; INTRAVENOUS at 01:17

## 2025-01-14 RX ADMIN — SODIUM CHLORIDE, PRESERVATIVE FREE 10 ML: 5 INJECTION INTRAVENOUS at 01:44

## 2025-01-14 RX ADMIN — ONDANSETRON 4 MG: 2 INJECTION INTRAMUSCULAR; INTRAVENOUS at 01:17

## 2025-01-14 RX ADMIN — OXYCODONE HYDROCHLORIDE AND ACETAMINOPHEN 1 TABLET: 5; 325 TABLET ORAL at 03:11

## 2025-01-14 RX ADMIN — IOPAMIDOL 75 ML: 755 INJECTION, SOLUTION INTRAVENOUS at 01:44

## 2025-01-14 ASSESSMENT — PAIN SCALES - GENERAL: PAINLEVEL_OUTOF10: 10

## 2025-01-14 ASSESSMENT — PAIN - FUNCTIONAL ASSESSMENT: PAIN_FUNCTIONAL_ASSESSMENT: 0-10

## 2025-01-14 NOTE — ED PROVIDER NOTES
eMERGENCY dEPARTMENT eNCOUnter      Pt Name: Segundo Mcmullen  MRN: [id number]  Birthdate 1940  Date of evaluation: 1/14/2025      CHIEF COMPLAINT       Chief Complaint   Patient presents with    Abdominal Pain     Mid abdomen onset 2 days ago  N/V/D-watery diarrhea            HISTORY OF PRESENT ILLNESS    Segundo Mcmullen is a 84 y.o. male who presents to the emergency department with a 2-day history of mid abdominal pain that concerned him secondary the fact that he has a colostomy and multiple hernias.  Patient has had a history of bowel obstructions that he was treated for just recently.  Also has had some nausea and vomiting with some watery diarrhea that just happened these last few days.  Patient has not been febrile however.    REVIEW OF SYSTEMS       Constitutional: No fevers or chills  HEENT: No sore throat, rhinorrhea, or earache  Eyes: No blurry vision or double vision no drainage  Cardiovascular: No chest pain or tachycardia  Respiratory: No wheezing or shortness of breath no cough  Gastrointestinal: No nausea, vomiting, diarrhea, constipation, or abdominal pain   : No hematuria or dysuria  Musculoskeletal: No swelling or pain  Skin: No rash   Neurological: No focal neurologic complaints, paresthesias, weakness, or headache    PAST MEDICAL HISTORY    has a past medical history of Choroid melanoma of left eye (HCC), Colon cancer (HCC), Hyperlipidemia, Hypertension, and [place] eye.    SURGICAL HISTORY      has a past surgical history that includes Spine surgery; Eye surgery (Left); and colostomy.    CURRENT MEDICATIONS       Previous Medications    CLONIDINE (CATAPRES) 0.3 MG TABLET    Take 1 tablet by mouth 2 times daily    CYANOCOBALAMIN 1000 MCG/ML INJECTION    Inject 1 mL into the muscle once PATIENT UNSURE OF DOSE - GET INJECTION ONCE A MONTH AT DR. JOHN OFFICE    FERROUS SULFATE (IRON 325) 325 (65 FE) MG TABLET    Take 1 tablet by mouth daily    FINASTERIDE (PROSCAR) 5 MG TABLET    Take

## 2025-01-14 NOTE — DISCHARGE INSTRUCTIONS
Abdominal Pain   The doctor who examined you has not found a serious cause of the abdominal pain at this time. It is important that you carefully watch for changes in the abdominal pain that might suggest a serious condition (such as appendicitis that is difficult to diagnose early). See your doctor or return to the emergency department immediately if your condition gets worse. You can also call us if you are not sure what to do.   See your doctor tomorrow or as soon as possible if you are not getting better.   These symptoms suggest serious causes of abdominal pain. Call your doctor or return to the emergency department if you are feeling worse or if:   1. You are unable to walk easily or are walking in a bent-over position.   2. Stepping or jumping results in severe pain.   3. You are experiencing pain in the right lower part of the abdomen.   4. The abdomen is hard and painful when you press on it.   5. There is severe abdominal pain when coughing.   6. You are vomiting or gagging.   7. Vomiting is bloody or green or looks like chocolate or coffee.   8. The belly looks very full or big.   9. You are experiencing severe pain every 3 to 20 minutes.   10. Stool (poop) is bloody or black.   11. You are drowsy, weak, fussy, or pale   Your laboratory evaluation has been unremarkable, your CT scan does not show any sign of a bowel obstruction.  You may be having just a gastric bug that is going through the population now.  We are recommending a clear liquid diet for 1 to 2 days, Tylenol for pain, lots of fluids and family doctor tomorrow.  Return if worse.

## 2025-01-25 ENCOUNTER — HOSPITAL ENCOUNTER (OUTPATIENT)
Age: 85
Setting detail: OBSERVATION
LOS: 1 days | Discharge: HOME | End: 2025-01-26
Attending: EMERGENCY MEDICINE | Admitting: STUDENT IN AN ORGANIZED HEALTH CARE EDUCATION/TRAINING PROGRAM
Payer: MEDICARE

## 2025-01-25 ENCOUNTER — APPOINTMENT (OUTPATIENT)
Dept: CT IMAGING | Age: 85
End: 2025-01-25
Payer: MEDICARE

## 2025-01-25 DIAGNOSIS — R19.00 PELVIC MASS: ICD-10-CM

## 2025-01-25 DIAGNOSIS — K56.609 SMALL BOWEL OBSTRUCTION (HCC): Primary | ICD-10-CM

## 2025-01-25 LAB
ALBUMIN SERPL-MCNC: 4.3 G/DL (ref 3.5–5.2)
ALBUMIN/GLOB SERPL: 1.3 {RATIO} (ref 1–2.5)
ALP SERPL-CCNC: 70 U/L (ref 40–129)
ALT SERPL-CCNC: 8 U/L (ref 5–41)
ANION GAP SERPL CALCULATED.3IONS-SCNC: 12 MMOL/L (ref 9–17)
AST SERPL-CCNC: 11 U/L
BASOPHILS # BLD: 0.1 K/UL (ref 0–0.2)
BASOPHILS NFR BLD: 1 % (ref 0–2)
BILIRUB SERPL-MCNC: 0.4 MG/DL (ref 0.3–1.2)
BUN SERPL-MCNC: 31 MG/DL (ref 8–23)
CALCIUM SERPL-MCNC: 9.5 MG/DL (ref 8.6–10.4)
CHLORIDE SERPL-SCNC: 108 MMOL/L (ref 98–107)
CO2 SERPL-SCNC: 21 MMOL/L (ref 20–31)
CREAT SERPL-MCNC: 1.6 MG/DL (ref 0.7–1.2)
EOSINOPHIL # BLD: 0.2 K/UL (ref 0–0.4)
EOSINOPHILS RELATIVE PERCENT: 2 % (ref 1–4)
ERYTHROCYTE [DISTWIDTH] IN BLOOD BY AUTOMATED COUNT: 14.2 % (ref 12.5–15.4)
GFR, ESTIMATED: 42 ML/MIN/1.73M2
GLUCOSE SERPL-MCNC: 119 MG/DL (ref 70–99)
HCT VFR BLD AUTO: 43.2 % (ref 41–53)
HGB BLD-MCNC: 14.4 G/DL (ref 13.5–17.5)
LACTATE BLDV-SCNC: 1.6 MMOL/L (ref 0.5–1.9)
LIPASE SERPL-CCNC: 20 U/L (ref 13–60)
LYMPHOCYTES NFR BLD: 0.7 K/UL (ref 1–4.8)
LYMPHOCYTES RELATIVE PERCENT: 5 % (ref 24–44)
MCH RBC QN AUTO: 32.1 PG (ref 26–34)
MCHC RBC AUTO-ENTMCNC: 33.3 G/DL (ref 31–37)
MCV RBC AUTO: 96.3 FL (ref 80–100)
MONOCYTES NFR BLD: 0.6 K/UL (ref 0.1–1.2)
MONOCYTES NFR BLD: 4 % (ref 2–11)
NEUTROPHILS NFR BLD: 88 % (ref 36–66)
NEUTS SEG NFR BLD: 12.9 K/UL (ref 1.8–7.7)
PLATELET # BLD AUTO: 290 K/UL (ref 140–450)
PMV BLD AUTO: 8.8 FL (ref 6–12)
POTASSIUM SERPL-SCNC: 5 MMOL/L (ref 3.7–5.3)
PROT SERPL-MCNC: 7.6 G/DL (ref 6.4–8.3)
RBC # BLD AUTO: 4.48 M/UL (ref 4.5–5.9)
SODIUM SERPL-SCNC: 141 MMOL/L (ref 135–144)
WBC OTHER # BLD: 14.5 K/UL (ref 3.5–11)

## 2025-01-25 PROCEDURE — 96374 THER/PROPH/DIAG INJ IV PUSH: CPT

## 2025-01-25 PROCEDURE — 85025 COMPLETE CBC W/AUTO DIFF WBC: CPT

## 2025-01-25 PROCEDURE — 83690 ASSAY OF LIPASE: CPT

## 2025-01-25 PROCEDURE — 74176 CT ABD & PELVIS W/O CONTRAST: CPT

## 2025-01-25 PROCEDURE — 2500000003 HC RX 250 WO HCPCS: Performed by: NURSE PRACTITIONER

## 2025-01-25 PROCEDURE — 83605 ASSAY OF LACTIC ACID: CPT

## 2025-01-25 PROCEDURE — 1200000000 HC SEMI PRIVATE

## 2025-01-25 PROCEDURE — 99285 EMERGENCY DEPT VISIT HI MDM: CPT

## 2025-01-25 PROCEDURE — 6360000002 HC RX W HCPCS

## 2025-01-25 PROCEDURE — 80053 COMPREHEN METABOLIC PANEL: CPT

## 2025-01-25 PROCEDURE — 96375 TX/PRO/DX INJ NEW DRUG ADDON: CPT

## 2025-01-25 RX ORDER — ONDANSETRON 2 MG/ML
4 INJECTION INTRAMUSCULAR; INTRAVENOUS ONCE
Status: COMPLETED | OUTPATIENT
Start: 2025-01-25 | End: 2025-01-25

## 2025-01-25 RX ORDER — OXYMETAZOLINE HYDROCHLORIDE 0.05 G/100ML
2 SPRAY NASAL ONCE
Status: DISCONTINUED | OUTPATIENT
Start: 2025-01-25 | End: 2025-01-26 | Stop reason: HOSPADM

## 2025-01-25 RX ORDER — POTASSIUM CHLORIDE 7.45 MG/ML
10 INJECTION INTRAVENOUS PRN
Status: DISCONTINUED | OUTPATIENT
Start: 2025-01-25 | End: 2025-01-26 | Stop reason: HOSPADM

## 2025-01-25 RX ORDER — ONDANSETRON 4 MG/1
4 TABLET, ORALLY DISINTEGRATING ORAL EVERY 8 HOURS PRN
Status: DISCONTINUED | OUTPATIENT
Start: 2025-01-25 | End: 2025-01-26 | Stop reason: HOSPADM

## 2025-01-25 RX ORDER — ACETAMINOPHEN 325 MG/1
650 TABLET ORAL EVERY 6 HOURS PRN
Status: DISCONTINUED | OUTPATIENT
Start: 2025-01-25 | End: 2025-01-26 | Stop reason: HOSPADM

## 2025-01-25 RX ORDER — ENOXAPARIN SODIUM 100 MG/ML
40 INJECTION SUBCUTANEOUS DAILY
Status: DISCONTINUED | OUTPATIENT
Start: 2025-01-26 | End: 2025-01-26 | Stop reason: HOSPADM

## 2025-01-25 RX ORDER — MORPHINE SULFATE 4 MG/ML
4 INJECTION, SOLUTION INTRAMUSCULAR; INTRAVENOUS ONCE
Status: COMPLETED | OUTPATIENT
Start: 2025-01-25 | End: 2025-01-25

## 2025-01-25 RX ORDER — POLYETHYLENE GLYCOL 3350 17 G/17G
17 POWDER, FOR SOLUTION ORAL DAILY PRN
Status: DISCONTINUED | OUTPATIENT
Start: 2025-01-25 | End: 2025-01-26 | Stop reason: HOSPADM

## 2025-01-25 RX ORDER — POTASSIUM CHLORIDE 1500 MG/1
40 TABLET, EXTENDED RELEASE ORAL PRN
Status: DISCONTINUED | OUTPATIENT
Start: 2025-01-25 | End: 2025-01-26 | Stop reason: HOSPADM

## 2025-01-25 RX ORDER — DEXTROSE MONOHYDRATE, SODIUM CHLORIDE, AND POTASSIUM CHLORIDE 50; 1.49; 4.5 G/1000ML; G/1000ML; G/1000ML
INJECTION, SOLUTION INTRAVENOUS CONTINUOUS
Status: DISCONTINUED | OUTPATIENT
Start: 2025-01-25 | End: 2025-01-26

## 2025-01-25 RX ORDER — ONDANSETRON 2 MG/ML
4 INJECTION INTRAMUSCULAR; INTRAVENOUS EVERY 6 HOURS PRN
Status: DISCONTINUED | OUTPATIENT
Start: 2025-01-25 | End: 2025-01-26 | Stop reason: HOSPADM

## 2025-01-25 RX ORDER — SODIUM CHLORIDE 0.9 % (FLUSH) 0.9 %
5-40 SYRINGE (ML) INJECTION EVERY 12 HOURS SCHEDULED
Status: DISCONTINUED | OUTPATIENT
Start: 2025-01-25 | End: 2025-01-26 | Stop reason: HOSPADM

## 2025-01-25 RX ORDER — SODIUM CHLORIDE 0.9 % (FLUSH) 0.9 %
10 SYRINGE (ML) INJECTION PRN
Status: DISCONTINUED | OUTPATIENT
Start: 2025-01-25 | End: 2025-01-26 | Stop reason: HOSPADM

## 2025-01-25 RX ORDER — ACETAMINOPHEN 650 MG/1
650 SUPPOSITORY RECTAL EVERY 6 HOURS PRN
Status: DISCONTINUED | OUTPATIENT
Start: 2025-01-25 | End: 2025-01-26 | Stop reason: HOSPADM

## 2025-01-25 RX ORDER — MORPHINE SULFATE 4 MG/ML
4 INJECTION, SOLUTION INTRAMUSCULAR; INTRAVENOUS EVERY 4 HOURS PRN
Status: DISCONTINUED | OUTPATIENT
Start: 2025-01-25 | End: 2025-01-26 | Stop reason: HOSPADM

## 2025-01-25 RX ORDER — LIDOCAINE HYDROCHLORIDE 20 MG/ML
JELLY TOPICAL ONCE
Status: DISCONTINUED | OUTPATIENT
Start: 2025-01-25 | End: 2025-01-26 | Stop reason: HOSPADM

## 2025-01-25 RX ORDER — MORPHINE SULFATE 2 MG/ML
2 INJECTION, SOLUTION INTRAMUSCULAR; INTRAVENOUS EVERY 4 HOURS PRN
Status: DISCONTINUED | OUTPATIENT
Start: 2025-01-25 | End: 2025-01-26 | Stop reason: HOSPADM

## 2025-01-25 RX ORDER — MAGNESIUM SULFATE IN WATER 40 MG/ML
2000 INJECTION, SOLUTION INTRAVENOUS PRN
Status: DISCONTINUED | OUTPATIENT
Start: 2025-01-25 | End: 2025-01-26 | Stop reason: HOSPADM

## 2025-01-25 RX ORDER — SODIUM CHLORIDE 9 MG/ML
INJECTION, SOLUTION INTRAVENOUS PRN
Status: DISCONTINUED | OUTPATIENT
Start: 2025-01-25 | End: 2025-01-26 | Stop reason: HOSPADM

## 2025-01-25 RX ADMIN — SODIUM CHLORIDE, PRESERVATIVE FREE 10 ML: 5 INJECTION INTRAVENOUS at 23:16

## 2025-01-25 RX ADMIN — ONDANSETRON 4 MG: 2 INJECTION INTRAMUSCULAR; INTRAVENOUS at 19:01

## 2025-01-25 RX ADMIN — POTASSIUM CHLORIDE, DEXTROSE MONOHYDRATE AND SODIUM CHLORIDE: 150; 5; 450 INJECTION, SOLUTION INTRAVENOUS at 23:20

## 2025-01-25 RX ADMIN — MORPHINE SULFATE 4 MG: 4 INJECTION, SOLUTION INTRAMUSCULAR; INTRAVENOUS at 19:01

## 2025-01-25 ASSESSMENT — PAIN DESCRIPTION - ORIENTATION: ORIENTATION: RIGHT

## 2025-01-25 ASSESSMENT — PAIN - FUNCTIONAL ASSESSMENT: PAIN_FUNCTIONAL_ASSESSMENT: 0-10

## 2025-01-25 ASSESSMENT — PAIN SCALES - GENERAL: PAINLEVEL_OUTOF10: 8

## 2025-01-25 ASSESSMENT — PAIN DESCRIPTION - LOCATION: LOCATION: ABDOMEN

## 2025-01-25 NOTE — ED PROVIDER NOTES
[facility] EMERGENCY DEPARTMENT  EMERGENCY DEPARTMENT ENCOUNTER      Pt Name: Segundo Mcmullen  MRN: [id number]  Birthdate 1940  Date of evaluation: 1/25/2025  Provider: BRAULIO Hollingsworth CNP  8:02 PM    CHIEF COMPLAINT       Chief Complaint   Patient presents with    GI Problem     Pt has a colostomy bag and he states that he has had bowel obstructions before. He has nothing emptying into his bag since this morning. Denies any changes that could cause this to happen.         HISTORY OF PRESENT ILLNESS    Segundo Mcmullen is a 84 y.o. male who presents to the emergency department with no output in colostomy all day     HPI  Here with concerns for incarcerated hernia and bowel obstruction.  He is having difficulty with his ventral hernia and has had no output in his colostomy all day.  History of colon cancer with colostomy and malignant melanoma of choroidal.  He was admitted here on 12/30 for small bowel obstruction he did not have any surgical intervention at that time.  He returned to the hospital on 1/14 for abdominal pain it was noted that he had multiple abdominal hernias no small bowel obstruction or bowel obstruction there was a mass concerning for malignancy in the left pelvis suspicious for neoplastic disease.  This is progressively increased lobulated low-density.  Has had some nausea without vomiting.  No fevers or chills    Nursing Notes were reviewed.    REVIEW OF SYSTEMS       Review of Systems   Constitutional:  Negative for chills, diaphoresis and fever.   HENT:  Negative for congestion.    Eyes:  Negative for visual disturbance.   Respiratory:  Negative for cough and shortness of breath.    Cardiovascular:  Negative for chest pain and leg swelling.   Gastrointestinal:  Positive for abdominal pain. Negative for constipation, diarrhea, nausea and vomiting.        No output from colostomy   Genitourinary:  Negative for frequency and urgency.   Musculoskeletal:  Negative for back

## 2025-01-26 VITALS
BODY MASS INDEX: 32.14 KG/M2 | SYSTOLIC BLOOD PRESSURE: 151 MMHG | HEIGHT: 66 IN | OXYGEN SATURATION: 97 % | TEMPERATURE: 97.8 F | DIASTOLIC BLOOD PRESSURE: 87 MMHG | WEIGHT: 200 LBS | RESPIRATION RATE: 18 BRPM | HEART RATE: 56 BPM

## 2025-01-26 LAB
ALBUMIN SERPL-MCNC: 3.4 G/DL (ref 3.5–5.2)
ALBUMIN/GLOB SERPL: 1.3 {RATIO} (ref 1–2.5)
ALP SERPL-CCNC: 52 U/L (ref 40–129)
ALT SERPL-CCNC: 6 U/L (ref 5–41)
ANION GAP SERPL CALCULATED.3IONS-SCNC: 7 MMOL/L (ref 9–17)
AST SERPL-CCNC: 9 U/L
BILIRUB SERPL-MCNC: 0.5 MG/DL (ref 0.3–1.2)
BUN SERPL-MCNC: 29 MG/DL (ref 8–23)
CALCIUM SERPL-MCNC: 8.9 MG/DL (ref 8.6–10.4)
CHLORIDE SERPL-SCNC: 111 MMOL/L (ref 98–107)
CO2 SERPL-SCNC: 24 MMOL/L (ref 20–31)
CREAT SERPL-MCNC: 1.6 MG/DL (ref 0.7–1.2)
GFR, ESTIMATED: 42 ML/MIN/1.73M2
GLUCOSE SERPL-MCNC: 112 MG/DL (ref 70–99)
LIPASE SERPL-CCNC: 23 U/L (ref 13–60)
MAGNESIUM SERPL-MCNC: 1.9 MG/DL (ref 1.6–2.6)
PHOSPHATE SERPL-MCNC: 3 MG/DL (ref 2.5–4.5)
POTASSIUM SERPL-SCNC: 5.1 MMOL/L (ref 3.7–5.3)
POTASSIUM SERPL-SCNC: 5.7 MMOL/L (ref 3.7–5.3)
PROT SERPL-MCNC: 6 G/DL (ref 6.4–8.3)
SODIUM SERPL-SCNC: 142 MMOL/L (ref 135–144)

## 2025-01-26 PROCEDURE — 6370000000 HC RX 637 (ALT 250 FOR IP): Performed by: NURSE PRACTITIONER

## 2025-01-26 PROCEDURE — 99222 1ST HOSP IP/OBS MODERATE 55: CPT | Performed by: STUDENT IN AN ORGANIZED HEALTH CARE EDUCATION/TRAINING PROGRAM

## 2025-01-26 PROCEDURE — G0378 HOSPITAL OBSERVATION PER HR: HCPCS

## 2025-01-26 PROCEDURE — 6370000000 HC RX 637 (ALT 250 FOR IP): Performed by: STUDENT IN AN ORGANIZED HEALTH CARE EDUCATION/TRAINING PROGRAM

## 2025-01-26 RX ORDER — LISINOPRIL 10 MG/1
10 TABLET ORAL DAILY
Status: DISCONTINUED | OUTPATIENT
Start: 2025-01-26 | End: 2025-01-26 | Stop reason: HOSPADM

## 2025-01-26 RX ORDER — CLONIDINE HYDROCHLORIDE 0.1 MG/1
0.3 TABLET ORAL 2 TIMES DAILY
Status: DISCONTINUED | OUTPATIENT
Start: 2025-01-26 | End: 2025-01-26 | Stop reason: HOSPADM

## 2025-01-26 RX ORDER — LISINOPRIL 20 MG/1
40 TABLET ORAL DAILY
Status: DISCONTINUED | OUTPATIENT
Start: 2025-01-26 | End: 2025-01-26

## 2025-01-26 RX ORDER — HYDRALAZINE HYDROCHLORIDE 20 MG/ML
5 INJECTION INTRAMUSCULAR; INTRAVENOUS EVERY 6 HOURS PRN
Status: DISCONTINUED | OUTPATIENT
Start: 2025-01-26 | End: 2025-01-26 | Stop reason: HOSPADM

## 2025-01-26 RX ORDER — CLONIDINE HYDROCHLORIDE 0.1 MG/1
0.3 TABLET ORAL 2 TIMES DAILY
Status: DISCONTINUED | OUTPATIENT
Start: 2025-01-26 | End: 2025-01-26

## 2025-01-26 RX ADMIN — LISINOPRIL 10 MG: 10 TABLET ORAL at 10:28

## 2025-01-26 RX ADMIN — CLONIDINE HYDROCHLORIDE 0.3 MG: 0.1 TABLET ORAL at 10:29

## 2025-01-26 RX ADMIN — LISINOPRIL 40 MG: 20 TABLET ORAL at 01:18

## 2025-01-26 RX ADMIN — CLONIDINE HYDROCHLORIDE 0.3 MG: 0.1 TABLET ORAL at 01:17

## 2025-01-26 ASSESSMENT — PAIN SCALES - GENERAL: PAINLEVEL_OUTOF10: 0

## 2025-01-26 NOTE — ED PROVIDER NOTES
[facility] Emergency Department  [address]  [facility] [address]  Phone: [phone]  Fax: [phone]      Attending Physician Attestation    I performed a history and physical examination of the patient and discussed management with the mid level provider. I reviewed the mid level provider's note and agree with the documented findings and plan of care. Any areas of disagreement are noted on the chart. I was personally present for the key portions of any procedures. I have documented in the chart those procedures where I was not present during the key portions. I have reviewed the emergency nurses triage note. I agree with the chief complaint, past medical history, past surgical history, allergies, medications, social and family history as documented unless otherwise noted below. Documentation of the HPI, Physical Exam and Medical Decision Making performed by mid level providers is based on my personal performance of the HPI, PE and MDM. For Physician Assistant/ Nurse Practitioner cases/documentation I have personally evaluated this patient and have completed at least one if not all key elements of the E/M (history, physical exam, and MDM). Additional findings are as noted.      CHIEF COMPLAINT       Chief Complaint   Patient presents with    GI Problem     Pt has a colostomy bag and he states that he has had bowel obstructions before. He has nothing emptying into his bag since this morning. Denies any changes that could cause this to happen.        PAST MEDICAL HISTORY     Past Medical History:   Diagnosis Date    Choroid melanoma of left eye (HCC)     Colon cancer (HCC)     Hyperlipidemia     Hypertension     Pink eye 03/18/2014    current treatment       SURGICAL HISTORY      has a past surgical history that includes Spine surgery; Eye surgery (Left); and colostomy.    CURRENT MEDICATIONS       Previous Medications    CLONIDINE (CATAPRES) 0.3 MG TABLET    Take 1 tablet by mouth 2

## 2025-01-26 NOTE — CONSULTS
Department of General Surgery - Adult  [facility]  Yair Zhang MD   Consult Note      Reason for Consult: Partial small bowel obstruction due to ventral hernia  Requesting Physician: Law Bueno    CHIEF COMPLAINT: Constipation and mild abdominal pain    History Obtained From:  patient    HISTORY OF PRESENT ILLNESS:                The patient is a 84 y.o. male who presents with constipation and mild abdominal pain.  This patient has seen my partner Dr. Gilbert in the past.  He had previous colon cancer surgery with a sigmoid colectomy complicated by an anastomotic leak followed by takedown of the anastomosis and creation of a permanent end colostomy.  He has had a couple of episodes of partial small bowel obstruction due to his chronic ventral incisional hernias over the last month but they have resolved without surgical intervention.    He has a history of malignant melanoma of the left eye and has also had a benign growth removed from his lower back.  A CT scan of the abdomen and pelvis yesterday showed a loop of small bowel trapped in the uppermost of 4 ventral incisional hernias with a transition zone at that site, indicative of a small bowel obstruction.  A 5.5 cm mass was noted in the left lower quadrant at the left iliac bifurcation, suspicious for malignant disease.  This has progressively grown compared to his previous CT scans.    As of this morning had no symptoms such as pain.  He was putting out plenty of flatus and some stool through his colostomy bag.  The hospitalist started him on a clear liquid diet, which he is tolerating well.  He now wants to go home.        Past Medical History:        Diagnosis Date    Choroid melanoma of left eye (HCC)     Colon cancer (HCC)     Hyperlipidemia     Hypertension     Pink eye 03/18/2014    current treatment     Past Surgical History:        Procedure Laterality Date    COLOSTOMY      EYE SURGERY Left     for cancer

## 2025-01-26 NOTE — CARE COORDINATION
Case Management Assessment  Initial Evaluation    Date/Time of Evaluation: 1/26/2025 11:42 AM  Assessment Completed by: Sabine Yang RN    If patient is discharged prior to next notation, then this note serves as note for discharge by case management.    Patient Name: Segundo Mcmullen                   YOB: 1940  Diagnosis: Pelvic mass [R19.00]  Small bowel obstruction (HCC) [K56.609]  SBO (small bowel obstruction) (HCC) [K56.609]                   Date / Time: 1/25/2025  6:08 PM    Patient Admission Status: Observation   Readmission Risk (Low < 19, Mod (19-27), High > 27): Readmission Risk Score: 13.9    Current PCP: Laz Nash MD  PCP verified by ? Yes    Chart Reviewed: Yes      History Provided by: Patient  Patient Orientation: Alert and Oriented, Person, Place, Situation    Patient Cognition: Alert    Hospitalization in the last 30 days (Readmission):  Yes    If yes, Readmission Assessment in  Navigator will be completed.    Advance Directives:      Code Status: Full Code   Patient's Primary Decision Maker is: Legal Next of Kin      Discharge Planning:    Patient lives with: Spouse/Significant Other Type of Home: House  Primary Care Giver: Self  Patient Support Systems include: Family Members   Current Financial resources: Medicare  Current community resources: None  Current services prior to admission: Other (Comment) (Ostomy Supplies)            Current DME:              Type of Home Care services:  None    ADLS  Prior functional level: Independent in ADLs/IADLs  Current functional level: Independent in ADLs/IADLs    PT AM-PAC:   /24  OT AM-PAC:   /24    Family can provide assistance at DC: Yes  Would you like Case Management to discuss the discharge plan with any other family members/significant others, and if so, who? No  Plans to Return to Present Housing: Yes  Other Identified Issues/Barriers to RETURNING to current housing: none  Potential Assistance needed at discharge:

## 2025-01-26 NOTE — PLAN OF CARE
Problem: Discharge Planning  Goal: Discharge to home or other facility with appropriate resources  1/26/2025 0748 by Nate Bernabe LPN  Outcome: Progressing  1/26/2025 0609 by Oppenheim, Evan, RN  Outcome: Progressing  Flowsheets (Taken 1/25/2025 2245)  Discharge to home or other facility with appropriate resources: Identify barriers to discharge with patient and caregiver     Problem: Pain  Goal: Verbalizes/displays adequate comfort level or baseline comfort level  1/26/2025 0748 by Nate Bernabe LPN  Outcome: Progressing  1/26/2025 0609 by Oppenheim, Evan, RN  Outcome: Progressing

## 2025-01-26 NOTE — ED TRIAGE NOTES
Patient refusing to have NG tube placed in ED. Writer discussed risks of refusing NG and patient acknowledges the risks. Patient states that he will be agreeable to NG if his symptoms worsen by tomorrow. Notified Attending of refusal.     Electronically signed by Liliya English RN on 1/25/2025 at 9:38 PM

## 2025-01-26 NOTE — ED NOTES
Homar Wilder given report.  
  oxymetazoline (AFRIN) 0.05 % nasal spray 2 spray (has no administration in time range)   lidocaine (XYLOCAINE) 2 % uro-jet (has no administration in time range)   ondansetron (ZOFRAN) injection 4 mg (4 mg IntraVENous Given 1/25/25 1901)   morphine injection 4 mg (4 mg IntraVENous Given 1/25/25 1901)          Ambulatory Status:  Presents to emergency department  because of falls (Syncope, seizure, or loss of consciousness): No, Age > 70: Yes, Altered Mental Status, Intoxication with alcohol or substance confusion (Disorientation, impaired judgment, poor safety awaremess, or inability to follow instructions): No, Impaired Mobility: Ambulates or transfers with assistive devices or assistance; Unable to ambulate or transer.: No, Nursing Judgement: No    Diagnosis:  DISPOSITION Admitted 01/25/2025 08:29:15 PM   Final diagnoses:   Small bowel obstruction (HCC)   Pelvic mass            Assessment Abnormalities : (List)  Neuro: Confused   Yes[] No[x]    Respiratory : Labored  Yes[] No[x]  Lung Sounds clear    Cardiac:   Regular  Yes[x] No[]  Irregular Yes[] No[x]    Rhythm nsr    :  Incontinent  Yes[] No[x]     Assessment Abnormalities abd pain      Skin Assessment: wnl       Pain Score:  Pain Assessment  Pain Assessment: 0-10  Pain Level: 8  Patient's Stated Pain Goal: 8  Pain Location: Abdomen  Pain Orientation: Right    ISOLATION Status  No active isolations    Labs:  Labs Reviewed   CBC WITH AUTO DIFFERENTIAL - Abnormal; Notable for the following components:       Result Value    WBC 14.5 (*)     RBC 4.48 (*)     Neutrophils % 88 (*)     Lymphocytes % 5 (*)     Neutrophils Absolute 12.90 (*)     Lymphocytes Absolute 0.70 (*)     All other components within normal limits   COMPREHENSIVE METABOLIC PANEL - Abnormal; Notable for the following components:    Chloride 108 (*)     Glucose 119 (*)     BUN 31 (*)     Creatinine 1.6 (*)     Est, Glom Filt Rate 42 (*)     All other components within normal limits   LIPASE

## 2025-01-26 NOTE — PLAN OF CARE
Problem: Discharge Planning  Goal: Discharge to home or other facility with appropriate resources  Outcome: Progressing  Flowsheets (Taken 1/25/2025 5492)  Discharge to home or other facility with appropriate resources: Identify barriers to discharge with patient and caregiver     Problem: Pain  Goal: Verbalizes/displays adequate comfort level or baseline comfort level  Outcome: Progressing

## 2025-01-26 NOTE — CARE COORDINATION
01/26/25 1139   Readmission Assessment   Number of Days since last admission? 8-30 days   Previous Disposition Home Alone   Who is being Interviewed Patient   What was the patient's/caregiver's perception as to why they think they needed to return back to the hospital? Other (Comment)  (no complaints)   Did you visit your Primary Care Physician after you left the hospital, before you returned this time? No   Why weren't you able to visit your PCP? Did not have an appointment   Did you see a specialist, such as Cardiac, Pulmonary, Orthopedic Physician, etc. after you left the hospital? No   Who advised the patient to return to the hospital? Self-referral   Does the patient report anything that got in the way of taking their medications? No   In our efforts to provide the best possible care to you and others like you, can you think of anything that we could have done to help you after you left the hospital the first time, so that you might not have needed to return so soon? Other (Comment)  (No complaints)

## 2025-01-26 NOTE — DISCHARGE SUMMARY
[facility]  Office: [phone]  David Valdez DO, James Tran DO, Jesus Mcgill DO, Mario Dutton DO, Lorna Coleman MD, Nazanin Gonzales MD, Otilio Vázquez MD, Radha Lobo MD,  Wale Viramontes MD, Elsi Almeida MD, Smith Banda MD,  Rhea Caldwell DO, Aleksandar Pyle MD, Law Bueno MD, Jaxon Valdez DO, Mi Duggan MD,  Errol Serrano DO, Elizabeth Christian MD, Ann Torrez MD, Laureen Del Valle MD, Konstantin Hernandez MD,  Rex Lopez MD, Abbey Grayson MD, Mateusz Groves MD, Janet Dick MD, Trevon Polanco MD, Valeriy Henry MD, Lazaro Levy DO, Nelson Jenkins MD, Rhea Beck MD, Mohsin Reza, MD, Shirley Waterhouse, CNP,  Tamra Browning CNP, Lazaro Naik, CNP,  Mariaelena Goodwin, [facility], Kristine Hernández, CNP, Lorin Lin, CNP, Melly Carter, CNP, Nkechi Benitez, CNP, Claire Ogden, PA-C, Ceci Le PA-C, Lisa Lechuga, CNP, Umu Solorzano, CNP,  Alicia Vinson, CNP, Drea Mclain, CNP, Kym Arenas, CNP,  Lamar Nelson, CNS, Bethany Moreau, CNP, Tosha Christopher, CNP,   Laura Maynard, CNP         [facility]   IN-PATIENT SERVICE   [facility]    Discharge Summary     Patient ID: Segundo Mcmullen  :  1940   MRN: [id number]     ACCOUNT:  [id number]   Patient's PCP: Laz Nash MD  Admit Date: 2025   Discharge Date: 2025  Length of Stay: 1  Code Status:  Full Code  Admitting Physician: Law Bueno MD  Discharge Physician: Law Bueno MD     Active Discharge Diagnoses:     Hospital Problem Lists:  Principal Problem:    SBO (small bowel obstruction) (HCC)  Active Problems:    History of colon cancer    Hypertension    Hyperlipidemia  Resolved Problems:    * No resolved hospital problems. *      Admission Condition:  fair     Discharged Condition: good    Hospital Stay:     Hospital Course:  Segundo Mcmullen is an 84-year-old male with a past medical history of colon cancer (s/p left hemicolectomy and colostomy creation) and recurrent

## 2025-01-26 NOTE — H&P
distress  Mental status: oriented to person, place, and time  Head:  normocephalic, atraumatic  Eye: no icterus, redness, pupils equal and reactive, extraocular eye movements intact, conjunctiva clear  Ear: normal external ear, no discharge, hearing intact  Nose:  no drainage noted  Mouth: mucous membranes moist  Neck: supple, no carotid bruits, thyroid not palpable  Lungs: Bilateral equal air entry, clear to ausculation, no wheezing, rales or rhonchi, normal effort  Cardiovascular: normal rate, regular rhythm, no murmur, gallop, rub  Abdomen: Colostomy bag present. Non-tender and non-distended.   Neurologic: There are no new focal motor or sensory deficits, normal muscle tone and bulk, no abnormal sensation, normal speech, cranial nerves II through XII grossly intact  Skin: No gross lesions, rashes, bruising or bleeding on exposed skin area  Extremities:  peripheral pulses palpable, no pedal edema or calf pain with palpation  Psych: normal affect     Investigations:      Laboratory Testing:  Recent Results (from the past 24 hour(s))   CBC with Auto Differential    Collection Time: 01/25/25  6:53 PM   Result Value Ref Range    WBC 14.5 (H) 3.5 - 11.0 k/uL    RBC 4.48 (L) 4.5 - 5.9 m/uL    Hemoglobin 14.4 13.5 - 17.5 g/dL    Hematocrit 43.2 41 - 53 %    MCV 96.3 80 - 100 fL    MCH 32.1 26 - 34 pg    MCHC 33.3 31 - 37 g/dL    RDW 14.2 12.5 - 15.4 %    Platelets 290 140 - 450 k/uL    MPV 8.8 6.0 - 12.0 fL    Neutrophils % 88 (H) 36 - 66 %    Lymphocytes % 5 (L) 24 - 44 %    Monocytes % 4 2 - 11 %    Eosinophils % 2 1 - 4 %    Basophils % 1 0 - 2 %    Neutrophils Absolute 12.90 (H) 1.8 - 7.7 k/uL    Lymphocytes Absolute 0.70 (L) 1.0 - 4.8 k/uL    Monocytes Absolute 0.60 0.1 - 1.2 k/uL    Eosinophils Absolute 0.20 0.0 - 0.4 k/uL    Basophils Absolute 0.10 0.0 - 0.2 k/uL   CMP    Collection Time: 01/25/25  6:53 PM   Result Value Ref Range    Sodium 141 135 - 144 mmol/L    Potassium 5.0 3.7 - 5.3 mmol/L    Chloride 108 (H)